# Patient Record
Sex: FEMALE | Race: WHITE | NOT HISPANIC OR LATINO | Employment: PART TIME | URBAN - METROPOLITAN AREA
[De-identification: names, ages, dates, MRNs, and addresses within clinical notes are randomized per-mention and may not be internally consistent; named-entity substitution may affect disease eponyms.]

---

## 2017-01-04 ENCOUNTER — COMMUNICATION - HEALTHEAST (OUTPATIENT)
Dept: FAMILY MEDICINE | Facility: CLINIC | Age: 27
End: 2017-01-04

## 2017-01-04 DIAGNOSIS — G47.00 INSOMNIA: ICD-10-CM

## 2017-01-26 ENCOUNTER — COMMUNICATION - HEALTHEAST (OUTPATIENT)
Dept: TELEHEALTH | Facility: CLINIC | Age: 27
End: 2017-01-26

## 2017-01-30 ENCOUNTER — OFFICE VISIT - HEALTHEAST (OUTPATIENT)
Dept: FAMILY MEDICINE | Facility: CLINIC | Age: 27
End: 2017-01-30

## 2017-01-30 DIAGNOSIS — N20.0 NEPHROLITHIASIS: ICD-10-CM

## 2017-01-30 DIAGNOSIS — Z13.1 DIABETES MELLITUS SCREENING: ICD-10-CM

## 2017-01-30 DIAGNOSIS — G47.00 INSOMNIA, UNSPECIFIED: ICD-10-CM

## 2017-01-30 DIAGNOSIS — Z13.21 ENCOUNTER FOR VITAMIN DEFICIENCY SCREENING: ICD-10-CM

## 2017-01-30 LAB — HBA1C MFR BLD: 4.9 % (ref 3.5–6.1)

## 2017-02-01 ENCOUNTER — COMMUNICATION - HEALTHEAST (OUTPATIENT)
Dept: FAMILY MEDICINE | Facility: CLINIC | Age: 27
End: 2017-02-01

## 2017-02-01 DIAGNOSIS — G47.00 INSOMNIA: ICD-10-CM

## 2017-02-22 ENCOUNTER — RECORDS - HEALTHEAST (OUTPATIENT)
Dept: ADMINISTRATIVE | Facility: OTHER | Age: 27
End: 2017-02-22

## 2017-03-02 ENCOUNTER — COMMUNICATION - HEALTHEAST (OUTPATIENT)
Dept: PHYSICAL MEDICINE AND REHAB | Facility: CLINIC | Age: 27
End: 2017-03-02

## 2017-03-02 DIAGNOSIS — M54.50 LOW BACK PAIN: ICD-10-CM

## 2017-03-02 DIAGNOSIS — M54.16 LUMBAR RADICULITIS: ICD-10-CM

## 2017-03-27 ENCOUNTER — COMMUNICATION - HEALTHEAST (OUTPATIENT)
Dept: PHYSICAL MEDICINE AND REHAB | Facility: CLINIC | Age: 27
End: 2017-03-27

## 2017-03-27 ENCOUNTER — COMMUNICATION - HEALTHEAST (OUTPATIENT)
Dept: FAMILY MEDICINE | Facility: CLINIC | Age: 27
End: 2017-03-27

## 2017-03-27 DIAGNOSIS — M54.16 LUMBAR RADICULITIS: ICD-10-CM

## 2017-03-27 DIAGNOSIS — M54.50 LOW BACK PAIN: ICD-10-CM

## 2017-03-27 DIAGNOSIS — G62.9 NEUROPATHY: ICD-10-CM

## 2017-05-05 ENCOUNTER — COMMUNICATION - HEALTHEAST (OUTPATIENT)
Dept: FAMILY MEDICINE | Facility: CLINIC | Age: 27
End: 2017-05-05

## 2017-05-05 DIAGNOSIS — G47.00 INSOMNIA: ICD-10-CM

## 2017-07-05 ENCOUNTER — COMMUNICATION - HEALTHEAST (OUTPATIENT)
Dept: PHYSICAL MEDICINE AND REHAB | Facility: CLINIC | Age: 27
End: 2017-07-05

## 2017-07-05 DIAGNOSIS — M54.16 LUMBAR RADICULITIS: ICD-10-CM

## 2017-07-05 DIAGNOSIS — M54.50 LOW BACK PAIN: ICD-10-CM

## 2017-09-17 ENCOUNTER — HEALTH MAINTENANCE LETTER (OUTPATIENT)
Age: 27
End: 2017-09-17

## 2017-10-31 ENCOUNTER — COMMUNICATION - HEALTHEAST (OUTPATIENT)
Dept: FAMILY MEDICINE | Facility: CLINIC | Age: 27
End: 2017-10-31

## 2017-10-31 DIAGNOSIS — G47.00 INSOMNIA: ICD-10-CM

## 2017-12-27 ENCOUNTER — AMBULATORY - HEALTHEAST (OUTPATIENT)
Dept: NURSING | Facility: CLINIC | Age: 27
End: 2017-12-27

## 2017-12-27 DIAGNOSIS — Z23 NEED FOR IMMUNIZATION AGAINST INFLUENZA: ICD-10-CM

## 2018-02-06 ENCOUNTER — OFFICE VISIT - HEALTHEAST (OUTPATIENT)
Dept: FAMILY MEDICINE | Facility: CLINIC | Age: 28
End: 2018-02-06

## 2018-02-06 DIAGNOSIS — M53.3 SACROILIAC JOINT DYSFUNCTION OF BOTH SIDES: ICD-10-CM

## 2018-02-06 DIAGNOSIS — R19.8 IRREGULAR BOWEL HABITS: ICD-10-CM

## 2018-03-28 ENCOUNTER — COMMUNICATION - HEALTHEAST (OUTPATIENT)
Dept: FAMILY MEDICINE | Facility: CLINIC | Age: 28
End: 2018-03-28

## 2018-03-28 DIAGNOSIS — G62.9 NEUROPATHY: ICD-10-CM

## 2018-04-13 ENCOUNTER — COMMUNICATION - HEALTHEAST (OUTPATIENT)
Dept: FAMILY MEDICINE | Facility: CLINIC | Age: 28
End: 2018-04-13

## 2018-04-13 DIAGNOSIS — M53.3 SACROILIAC JOINT DYSFUNCTION OF BOTH SIDES: ICD-10-CM

## 2018-04-29 ENCOUNTER — COMMUNICATION - HEALTHEAST (OUTPATIENT)
Dept: FAMILY MEDICINE | Facility: CLINIC | Age: 28
End: 2018-04-29

## 2018-04-29 DIAGNOSIS — G47.00 INSOMNIA: ICD-10-CM

## 2018-05-12 ENCOUNTER — COMMUNICATION - HEALTHEAST (OUTPATIENT)
Dept: FAMILY MEDICINE | Facility: CLINIC | Age: 28
End: 2018-05-12

## 2018-05-12 DIAGNOSIS — M53.3 SACROILIAC JOINT DYSFUNCTION OF BOTH SIDES: ICD-10-CM

## 2018-06-11 ENCOUNTER — COMMUNICATION - HEALTHEAST (OUTPATIENT)
Dept: FAMILY MEDICINE | Facility: CLINIC | Age: 28
End: 2018-06-11

## 2018-06-11 DIAGNOSIS — M53.3 SACROILIAC JOINT DYSFUNCTION OF BOTH SIDES: ICD-10-CM

## 2018-07-08 ENCOUNTER — COMMUNICATION - HEALTHEAST (OUTPATIENT)
Dept: FAMILY MEDICINE | Facility: CLINIC | Age: 28
End: 2018-07-08

## 2018-07-08 DIAGNOSIS — M53.3 SACROILIAC JOINT DYSFUNCTION OF BOTH SIDES: ICD-10-CM

## 2018-08-08 ENCOUNTER — COMMUNICATION - HEALTHEAST (OUTPATIENT)
Dept: FAMILY MEDICINE | Facility: CLINIC | Age: 28
End: 2018-08-08

## 2018-08-08 DIAGNOSIS — M53.3 SACROILIAC JOINT DYSFUNCTION OF BOTH SIDES: ICD-10-CM

## 2018-08-28 ENCOUNTER — COMMUNICATION - HEALTHEAST (OUTPATIENT)
Dept: FAMILY MEDICINE | Facility: CLINIC | Age: 28
End: 2018-08-28

## 2018-08-28 DIAGNOSIS — G62.9 NEUROPATHY: ICD-10-CM

## 2018-09-04 ENCOUNTER — COMMUNICATION - HEALTHEAST (OUTPATIENT)
Dept: FAMILY MEDICINE | Facility: CLINIC | Age: 28
End: 2018-09-04

## 2018-09-04 DIAGNOSIS — M53.3 SACROILIAC JOINT DYSFUNCTION OF BOTH SIDES: ICD-10-CM

## 2018-10-05 ENCOUNTER — COMMUNICATION - HEALTHEAST (OUTPATIENT)
Dept: FAMILY MEDICINE | Facility: CLINIC | Age: 28
End: 2018-10-05

## 2018-10-05 DIAGNOSIS — M53.3 SACROILIAC JOINT DYSFUNCTION OF BOTH SIDES: ICD-10-CM

## 2018-11-05 ENCOUNTER — COMMUNICATION - HEALTHEAST (OUTPATIENT)
Dept: FAMILY MEDICINE | Facility: CLINIC | Age: 28
End: 2018-11-05

## 2018-11-05 DIAGNOSIS — M53.3 SACROILIAC JOINT DYSFUNCTION OF BOTH SIDES: ICD-10-CM

## 2018-11-30 ENCOUNTER — COMMUNICATION - HEALTHEAST (OUTPATIENT)
Dept: FAMILY MEDICINE | Facility: CLINIC | Age: 28
End: 2018-11-30

## 2018-11-30 DIAGNOSIS — M53.3 SACROILIAC JOINT DYSFUNCTION OF BOTH SIDES: ICD-10-CM

## 2018-12-01 ENCOUNTER — COMMUNICATION - HEALTHEAST (OUTPATIENT)
Dept: FAMILY MEDICINE | Facility: CLINIC | Age: 28
End: 2018-12-01

## 2018-12-01 DIAGNOSIS — M53.3 SACROILIAC JOINT DYSFUNCTION OF BOTH SIDES: ICD-10-CM

## 2018-12-11 ENCOUNTER — OFFICE VISIT - HEALTHEAST (OUTPATIENT)
Dept: FAMILY MEDICINE | Facility: CLINIC | Age: 28
End: 2018-12-11

## 2018-12-11 DIAGNOSIS — E55.9 VITAMIN D DEFICIENCY: ICD-10-CM

## 2018-12-11 DIAGNOSIS — Z00.00 ROUTINE GENERAL MEDICAL EXAMINATION AT A HEALTH CARE FACILITY: ICD-10-CM

## 2018-12-11 DIAGNOSIS — G47.00 INSOMNIA: ICD-10-CM

## 2018-12-11 DIAGNOSIS — M51.26 DISPLACEMENT OF LUMBAR INTERVERTEBRAL DISC WITHOUT MYELOPATHY: ICD-10-CM

## 2018-12-11 DIAGNOSIS — F51.01 PRIMARY INSOMNIA: ICD-10-CM

## 2018-12-11 ASSESSMENT — MIFFLIN-ST. JEOR: SCORE: 1346.56

## 2018-12-17 LAB
BKR LAB AP ABNORMAL BLEEDING: NO
BKR LAB AP BIRTH CONTROL/HORMONES: NORMAL
BKR LAB AP CERVICAL APPEARANCE: NORMAL
BKR LAB AP GYN ADEQUACY: NORMAL
BKR LAB AP GYN INTERPRETATION: NORMAL
BKR LAB AP HPV REFLEX: NORMAL
BKR LAB AP LMP: NORMAL
BKR LAB AP PATIENT STATUS: NORMAL
BKR LAB AP PREVIOUS ABNORMAL: NORMAL
BKR LAB AP PREVIOUS NORMAL: 2015
HIGH RISK?: NO
PATH REPORT.COMMENTS IMP SPEC: NORMAL
RESULT FLAG (HE HISTORICAL CONVERSION): NORMAL

## 2018-12-26 ENCOUNTER — COMMUNICATION - HEALTHEAST (OUTPATIENT)
Dept: FAMILY MEDICINE | Facility: CLINIC | Age: 28
End: 2018-12-26

## 2018-12-26 DIAGNOSIS — M53.3 SACROILIAC JOINT DYSFUNCTION OF BOTH SIDES: ICD-10-CM

## 2019-01-24 ENCOUNTER — COMMUNICATION - HEALTHEAST (OUTPATIENT)
Dept: FAMILY MEDICINE | Facility: CLINIC | Age: 29
End: 2019-01-24

## 2019-01-24 DIAGNOSIS — M53.3 SACROILIAC JOINT DYSFUNCTION OF BOTH SIDES: ICD-10-CM

## 2019-03-19 ENCOUNTER — OFFICE VISIT - HEALTHEAST (OUTPATIENT)
Dept: FAMILY MEDICINE | Facility: CLINIC | Age: 29
End: 2019-03-19

## 2019-03-19 DIAGNOSIS — T17.998A ASPIRATION OF LIQUID, INITIAL ENCOUNTER: ICD-10-CM

## 2019-03-19 DIAGNOSIS — R05.9 COUGH: ICD-10-CM

## 2019-05-14 ENCOUNTER — COMMUNICATION - HEALTHEAST (OUTPATIENT)
Dept: FAMILY MEDICINE | Facility: CLINIC | Age: 29
End: 2019-05-14

## 2019-05-14 DIAGNOSIS — M53.3 SACROILIAC JOINT DYSFUNCTION OF BOTH SIDES: ICD-10-CM

## 2019-05-17 ENCOUNTER — COMMUNICATION - HEALTHEAST (OUTPATIENT)
Dept: FAMILY MEDICINE | Facility: CLINIC | Age: 29
End: 2019-05-17

## 2019-05-17 DIAGNOSIS — G62.9 NEUROPATHY: ICD-10-CM

## 2019-08-24 ENCOUNTER — COMMUNICATION - HEALTHEAST (OUTPATIENT)
Dept: FAMILY MEDICINE | Facility: CLINIC | Age: 29
End: 2019-08-24

## 2019-08-24 DIAGNOSIS — M53.3 SACROILIAC JOINT DYSFUNCTION OF BOTH SIDES: ICD-10-CM

## 2019-09-17 ENCOUNTER — OFFICE VISIT - HEALTHEAST (OUTPATIENT)
Dept: FAMILY MEDICINE | Facility: CLINIC | Age: 29
End: 2019-09-17

## 2019-09-17 DIAGNOSIS — G62.9 NEUROPATHY: ICD-10-CM

## 2019-09-17 DIAGNOSIS — F51.01 PRIMARY INSOMNIA: ICD-10-CM

## 2019-09-17 DIAGNOSIS — M51.26 DISPLACEMENT OF LUMBAR INTERVERTEBRAL DISC WITHOUT MYELOPATHY: ICD-10-CM

## 2019-09-17 DIAGNOSIS — Z13.1 SCREENING FOR DIABETES MELLITUS: ICD-10-CM

## 2019-09-17 DIAGNOSIS — M53.3 SACROILIAC JOINT DYSFUNCTION OF BOTH SIDES: ICD-10-CM

## 2019-09-17 DIAGNOSIS — R03.0 ELEVATED BLOOD PRESSURE READING WITHOUT DIAGNOSIS OF HYPERTENSION: ICD-10-CM

## 2019-09-17 LAB — HBA1C MFR BLD: 5.1 % (ref 3.5–6)

## 2019-09-18 ENCOUNTER — OFFICE VISIT - HEALTHEAST (OUTPATIENT)
Dept: FAMILY MEDICINE | Facility: CLINIC | Age: 29
End: 2019-09-18

## 2019-09-18 DIAGNOSIS — G44.209 TENSION HEADACHE: ICD-10-CM

## 2019-09-18 DIAGNOSIS — I10 ESSENTIAL HYPERTENSION: ICD-10-CM

## 2019-09-18 LAB
ALBUMIN SERPL-MCNC: 4 G/DL (ref 3.5–5)
ALP SERPL-CCNC: 85 U/L (ref 45–120)
ALT SERPL W P-5'-P-CCNC: <9 U/L (ref 0–45)
ANION GAP SERPL CALCULATED.3IONS-SCNC: 9 MMOL/L (ref 5–18)
AST SERPL W P-5'-P-CCNC: 19 U/L (ref 0–40)
BILIRUB SERPL-MCNC: 0.3 MG/DL (ref 0–1)
BUN SERPL-MCNC: 7 MG/DL (ref 8–22)
CALCIUM SERPL-MCNC: 9.7 MG/DL (ref 8.5–10.5)
CHLORIDE BLD-SCNC: 101 MMOL/L (ref 98–107)
CO2 SERPL-SCNC: 29 MMOL/L (ref 22–31)
CREAT SERPL-MCNC: 0.78 MG/DL (ref 0.6–1.1)
ERYTHROCYTE [DISTWIDTH] IN BLOOD BY AUTOMATED COUNT: 12.1 % (ref 11–14.5)
GFR SERPL CREATININE-BSD FRML MDRD: >60 ML/MIN/1.73M2
GLUCOSE BLD-MCNC: 89 MG/DL (ref 70–125)
HCT VFR BLD AUTO: 41.7 % (ref 35–47)
HGB BLD-MCNC: 13.7 G/DL (ref 12–16)
MCH RBC QN AUTO: 29 PG (ref 27–34)
MCHC RBC AUTO-ENTMCNC: 32.9 G/DL (ref 32–36)
MCV RBC AUTO: 88 FL (ref 80–100)
PLATELET # BLD AUTO: 309 THOU/UL (ref 140–440)
PMV BLD AUTO: 7.2 FL (ref 7–10)
POTASSIUM BLD-SCNC: 5.1 MMOL/L (ref 3.5–5)
PROT SERPL-MCNC: 7.1 G/DL (ref 6–8)
RBC # BLD AUTO: 4.73 MILL/UL (ref 3.8–5.4)
SODIUM SERPL-SCNC: 139 MMOL/L (ref 136–145)
TSH SERPL DL<=0.005 MIU/L-ACNC: 0.81 UIU/ML (ref 0.3–5)
WBC: 7.2 THOU/UL (ref 4–11)

## 2019-09-18 ASSESSMENT — MIFFLIN-ST. JEOR: SCORE: 1326.43

## 2019-09-25 ENCOUNTER — OFFICE VISIT - HEALTHEAST (OUTPATIENT)
Dept: FAMILY MEDICINE | Facility: CLINIC | Age: 29
End: 2019-09-25

## 2019-09-25 DIAGNOSIS — I10 ESSENTIAL HYPERTENSION: ICD-10-CM

## 2019-09-25 DIAGNOSIS — G62.9 NEUROPATHY: ICD-10-CM

## 2019-09-25 DIAGNOSIS — M51.26 DISPLACEMENT OF LUMBAR INTERVERTEBRAL DISC WITHOUT MYELOPATHY: ICD-10-CM

## 2019-09-25 DIAGNOSIS — G89.29 CHRONIC NONINTRACTABLE HEADACHE, UNSPECIFIED HEADACHE TYPE: ICD-10-CM

## 2019-09-25 DIAGNOSIS — M53.3 SACROILIAC JOINT DYSFUNCTION OF BOTH SIDES: ICD-10-CM

## 2019-09-25 DIAGNOSIS — R51.9 CHRONIC NONINTRACTABLE HEADACHE, UNSPECIFIED HEADACHE TYPE: ICD-10-CM

## 2019-09-25 ASSESSMENT — MIFFLIN-ST. JEOR: SCORE: 1335.79

## 2019-10-01 ENCOUNTER — OFFICE VISIT - HEALTHEAST (OUTPATIENT)
Dept: FAMILY MEDICINE | Facility: CLINIC | Age: 29
End: 2019-10-01

## 2019-10-01 DIAGNOSIS — I10 ESSENTIAL HYPERTENSION: ICD-10-CM

## 2019-10-01 LAB
ANION GAP SERPL CALCULATED.3IONS-SCNC: 12 MMOL/L (ref 5–18)
BUN SERPL-MCNC: 8 MG/DL (ref 8–22)
CALCIUM SERPL-MCNC: 9.9 MG/DL (ref 8.5–10.5)
CHLORIDE BLD-SCNC: 97 MMOL/L (ref 98–107)
CO2 SERPL-SCNC: 28 MMOL/L (ref 22–31)
CREAT SERPL-MCNC: 0.84 MG/DL (ref 0.6–1.1)
GFR SERPL CREATININE-BSD FRML MDRD: >60 ML/MIN/1.73M2
GLUCOSE BLD-MCNC: 107 MG/DL (ref 70–125)
POTASSIUM BLD-SCNC: 4.4 MMOL/L (ref 3.5–5)
SODIUM SERPL-SCNC: 137 MMOL/L (ref 136–145)

## 2019-10-01 ASSESSMENT — MIFFLIN-ST. JEOR: SCORE: 1326.43

## 2019-10-13 ENCOUNTER — COMMUNICATION - HEALTHEAST (OUTPATIENT)
Dept: FAMILY MEDICINE | Facility: CLINIC | Age: 29
End: 2019-10-13

## 2019-10-13 DIAGNOSIS — R51.9 CHRONIC NONINTRACTABLE HEADACHE, UNSPECIFIED HEADACHE TYPE: ICD-10-CM

## 2019-10-13 DIAGNOSIS — G89.29 CHRONIC NONINTRACTABLE HEADACHE, UNSPECIFIED HEADACHE TYPE: ICD-10-CM

## 2019-10-13 DIAGNOSIS — G62.9 NEUROPATHY: ICD-10-CM

## 2019-10-13 DIAGNOSIS — I10 ESSENTIAL HYPERTENSION: ICD-10-CM

## 2019-10-13 DIAGNOSIS — M53.3 SACROILIAC JOINT DYSFUNCTION OF BOTH SIDES: ICD-10-CM

## 2019-10-13 DIAGNOSIS — M51.26 DISPLACEMENT OF LUMBAR INTERVERTEBRAL DISC WITHOUT MYELOPATHY: ICD-10-CM

## 2019-10-31 ENCOUNTER — COMMUNICATION - HEALTHEAST (OUTPATIENT)
Dept: ADMINISTRATIVE | Facility: CLINIC | Age: 29
End: 2019-10-31

## 2019-11-04 ENCOUNTER — COMMUNICATION - HEALTHEAST (OUTPATIENT)
Dept: FAMILY MEDICINE | Facility: CLINIC | Age: 29
End: 2019-11-04

## 2019-11-04 DIAGNOSIS — I10 ESSENTIAL HYPERTENSION: ICD-10-CM

## 2019-12-02 ENCOUNTER — OFFICE VISIT - HEALTHEAST (OUTPATIENT)
Dept: FAMILY MEDICINE | Facility: CLINIC | Age: 29
End: 2019-12-02

## 2019-12-02 DIAGNOSIS — I10 ESSENTIAL HYPERTENSION: ICD-10-CM

## 2019-12-02 ASSESSMENT — MIFFLIN-ST. JEOR: SCORE: 1332.1

## 2020-01-04 ENCOUNTER — COMMUNICATION - HEALTHEAST (OUTPATIENT)
Dept: FAMILY MEDICINE | Facility: CLINIC | Age: 30
End: 2020-01-04

## 2020-01-04 DIAGNOSIS — M53.3 SACROILIAC JOINT DYSFUNCTION OF BOTH SIDES: ICD-10-CM

## 2020-01-04 DIAGNOSIS — G89.29 CHRONIC NONINTRACTABLE HEADACHE, UNSPECIFIED HEADACHE TYPE: ICD-10-CM

## 2020-01-04 DIAGNOSIS — M51.26 DISPLACEMENT OF LUMBAR INTERVERTEBRAL DISC WITHOUT MYELOPATHY: ICD-10-CM

## 2020-01-04 DIAGNOSIS — R51.9 CHRONIC NONINTRACTABLE HEADACHE, UNSPECIFIED HEADACHE TYPE: ICD-10-CM

## 2020-01-04 DIAGNOSIS — G62.9 NEUROPATHY: ICD-10-CM

## 2020-01-06 ENCOUNTER — COMMUNICATION - HEALTHEAST (OUTPATIENT)
Dept: FAMILY MEDICINE | Facility: CLINIC | Age: 30
End: 2020-01-06

## 2020-01-06 DIAGNOSIS — E55.9 VITAMIN D DEFICIENCY: ICD-10-CM

## 2020-02-10 ENCOUNTER — COMMUNICATION - HEALTHEAST (OUTPATIENT)
Dept: FAMILY MEDICINE | Facility: CLINIC | Age: 30
End: 2020-02-10

## 2020-02-10 DIAGNOSIS — I10 ESSENTIAL HYPERTENSION: ICD-10-CM

## 2020-02-14 ENCOUNTER — COMMUNICATION - HEALTHEAST (OUTPATIENT)
Dept: FAMILY MEDICINE | Facility: CLINIC | Age: 30
End: 2020-02-14

## 2020-02-14 DIAGNOSIS — M53.3 SACROILIAC JOINT DYSFUNCTION OF BOTH SIDES: ICD-10-CM

## 2020-02-14 DIAGNOSIS — G62.9 NEUROPATHY: ICD-10-CM

## 2020-02-14 DIAGNOSIS — M51.26 DISPLACEMENT OF LUMBAR INTERVERTEBRAL DISC WITHOUT MYELOPATHY: ICD-10-CM

## 2020-02-17 ENCOUNTER — COMMUNICATION - HEALTHEAST (OUTPATIENT)
Dept: FAMILY MEDICINE | Facility: CLINIC | Age: 30
End: 2020-02-17

## 2020-02-17 DIAGNOSIS — G62.9 NEUROPATHY: ICD-10-CM

## 2020-02-17 DIAGNOSIS — M53.3 SACROILIAC JOINT DYSFUNCTION OF BOTH SIDES: ICD-10-CM

## 2020-02-17 DIAGNOSIS — M51.26 DISPLACEMENT OF LUMBAR INTERVERTEBRAL DISC WITHOUT MYELOPATHY: ICD-10-CM

## 2020-03-05 ENCOUNTER — COMMUNICATION - HEALTHEAST (OUTPATIENT)
Dept: FAMILY MEDICINE | Facility: CLINIC | Age: 30
End: 2020-03-05

## 2020-03-05 DIAGNOSIS — M51.26 DISPLACEMENT OF LUMBAR INTERVERTEBRAL DISC WITHOUT MYELOPATHY: ICD-10-CM

## 2020-03-05 DIAGNOSIS — G62.9 NEUROPATHY: ICD-10-CM

## 2020-03-05 DIAGNOSIS — M53.3 SACROILIAC JOINT DYSFUNCTION OF BOTH SIDES: ICD-10-CM

## 2020-03-05 DIAGNOSIS — R51.9 CHRONIC NONINTRACTABLE HEADACHE, UNSPECIFIED HEADACHE TYPE: ICD-10-CM

## 2020-03-05 DIAGNOSIS — G89.29 CHRONIC NONINTRACTABLE HEADACHE, UNSPECIFIED HEADACHE TYPE: ICD-10-CM

## 2020-04-06 ENCOUNTER — COMMUNICATION - HEALTHEAST (OUTPATIENT)
Dept: FAMILY MEDICINE | Facility: CLINIC | Age: 30
End: 2020-04-06

## 2020-04-06 DIAGNOSIS — M53.3 SACROILIAC JOINT DYSFUNCTION OF BOTH SIDES: ICD-10-CM

## 2020-04-06 DIAGNOSIS — G89.29 CHRONIC NONINTRACTABLE HEADACHE, UNSPECIFIED HEADACHE TYPE: ICD-10-CM

## 2020-04-06 DIAGNOSIS — G62.9 NEUROPATHY: ICD-10-CM

## 2020-04-06 DIAGNOSIS — R51.9 CHRONIC NONINTRACTABLE HEADACHE, UNSPECIFIED HEADACHE TYPE: ICD-10-CM

## 2020-04-06 DIAGNOSIS — M51.26 DISPLACEMENT OF LUMBAR INTERVERTEBRAL DISC WITHOUT MYELOPATHY: ICD-10-CM

## 2020-05-07 ENCOUNTER — COMMUNICATION - HEALTHEAST (OUTPATIENT)
Dept: FAMILY MEDICINE | Facility: CLINIC | Age: 30
End: 2020-05-07

## 2020-05-12 ENCOUNTER — OFFICE VISIT - HEALTHEAST (OUTPATIENT)
Dept: FAMILY MEDICINE | Facility: CLINIC | Age: 30
End: 2020-05-12

## 2020-05-12 DIAGNOSIS — M51.26 DISPLACEMENT OF LUMBAR INTERVERTEBRAL DISC WITHOUT MYELOPATHY: ICD-10-CM

## 2020-05-12 DIAGNOSIS — M53.3 SACROILIAC JOINT DYSFUNCTION OF BOTH SIDES: ICD-10-CM

## 2020-05-12 DIAGNOSIS — I10 ESSENTIAL HYPERTENSION: ICD-10-CM

## 2020-05-12 DIAGNOSIS — G89.29 CHRONIC NONINTRACTABLE HEADACHE, UNSPECIFIED HEADACHE TYPE: ICD-10-CM

## 2020-05-12 DIAGNOSIS — G62.9 NEUROPATHY: ICD-10-CM

## 2020-05-12 DIAGNOSIS — R51.9 CHRONIC NONINTRACTABLE HEADACHE, UNSPECIFIED HEADACHE TYPE: ICD-10-CM

## 2020-05-25 ENCOUNTER — COMMUNICATION - HEALTHEAST (OUTPATIENT)
Dept: FAMILY MEDICINE | Facility: CLINIC | Age: 30
End: 2020-05-25

## 2020-05-26 ENCOUNTER — COMMUNICATION - HEALTHEAST (OUTPATIENT)
Dept: SCHEDULING | Facility: CLINIC | Age: 30
End: 2020-05-26

## 2020-05-26 ENCOUNTER — OFFICE VISIT - HEALTHEAST (OUTPATIENT)
Dept: FAMILY MEDICINE | Facility: CLINIC | Age: 30
End: 2020-05-26

## 2020-05-26 DIAGNOSIS — M54.42 ACUTE BILATERAL LOW BACK PAIN WITH BILATERAL SCIATICA: ICD-10-CM

## 2020-05-26 DIAGNOSIS — M54.41 ACUTE BILATERAL LOW BACK PAIN WITH BILATERAL SCIATICA: ICD-10-CM

## 2020-06-18 ENCOUNTER — COMMUNICATION - HEALTHEAST (OUTPATIENT)
Dept: FAMILY MEDICINE | Facility: CLINIC | Age: 30
End: 2020-06-18

## 2020-06-19 ENCOUNTER — OFFICE VISIT - HEALTHEAST (OUTPATIENT)
Dept: FAMILY MEDICINE | Facility: CLINIC | Age: 30
End: 2020-06-19

## 2020-06-19 DIAGNOSIS — L55.9 SUNBURN: ICD-10-CM

## 2020-08-01 ENCOUNTER — COMMUNICATION - HEALTHEAST (OUTPATIENT)
Dept: FAMILY MEDICINE | Facility: CLINIC | Age: 30
End: 2020-08-01

## 2020-08-04 ENCOUNTER — OFFICE VISIT - HEALTHEAST (OUTPATIENT)
Dept: FAMILY MEDICINE | Facility: CLINIC | Age: 30
End: 2020-08-04

## 2020-08-04 DIAGNOSIS — M51.26 DISPLACEMENT OF LUMBAR INTERVERTEBRAL DISC WITHOUT MYELOPATHY: ICD-10-CM

## 2020-08-04 DIAGNOSIS — G62.9 NEUROPATHY: ICD-10-CM

## 2020-08-04 DIAGNOSIS — M54.16 LUMBAR RADICULOPATHY: ICD-10-CM

## 2020-08-04 DIAGNOSIS — M53.3 SACROILIAC JOINT DYSFUNCTION OF BOTH SIDES: ICD-10-CM

## 2020-08-04 DIAGNOSIS — R51.9 CHRONIC NONINTRACTABLE HEADACHE, UNSPECIFIED HEADACHE TYPE: ICD-10-CM

## 2020-08-04 DIAGNOSIS — G89.29 CHRONIC NONINTRACTABLE HEADACHE, UNSPECIFIED HEADACHE TYPE: ICD-10-CM

## 2020-08-17 ENCOUNTER — HOSPITAL ENCOUNTER (OUTPATIENT)
Dept: PHYSICAL MEDICINE AND REHAB | Facility: CLINIC | Age: 30
Discharge: HOME | End: 2020-08-17
Attending: FAMILY MEDICINE

## 2020-08-17 DIAGNOSIS — M51.26 DISPLACEMENT OF LUMBAR INTERVERTEBRAL DISC WITHOUT MYELOPATHY: ICD-10-CM

## 2020-08-17 DIAGNOSIS — M53.3 SACROILIAC JOINT DYSFUNCTION OF BOTH SIDES: ICD-10-CM

## 2020-08-17 ASSESSMENT — MIFFLIN-ST. JEOR: SCORE: 1330.97

## 2020-08-23 ENCOUNTER — COMMUNICATION - HEALTHEAST (OUTPATIENT)
Dept: PHYSICAL MEDICINE AND REHAB | Facility: CLINIC | Age: 30
End: 2020-08-23

## 2020-08-23 DIAGNOSIS — M51.26 DISPLACEMENT OF LUMBAR INTERVERTEBRAL DISC WITHOUT MYELOPATHY: ICD-10-CM

## 2020-08-23 DIAGNOSIS — M53.3 SACROILIAC JOINT DYSFUNCTION OF BOTH SIDES: ICD-10-CM

## 2020-08-23 DIAGNOSIS — R51.9 CHRONIC NONINTRACTABLE HEADACHE, UNSPECIFIED HEADACHE TYPE: ICD-10-CM

## 2020-08-23 DIAGNOSIS — G62.9 NEUROPATHY: ICD-10-CM

## 2020-08-23 DIAGNOSIS — G89.29 CHRONIC NONINTRACTABLE HEADACHE, UNSPECIFIED HEADACHE TYPE: ICD-10-CM

## 2020-09-01 ENCOUNTER — OFFICE VISIT - HEALTHEAST (OUTPATIENT)
Dept: PHYSICAL THERAPY | Facility: REHABILITATION | Age: 30
End: 2020-09-01

## 2020-09-01 DIAGNOSIS — M53.3 SACROILIAC JOINT DYSFUNCTION: ICD-10-CM

## 2021-01-04 ENCOUNTER — COMMUNICATION - HEALTHEAST (OUTPATIENT)
Dept: FAMILY MEDICINE | Facility: CLINIC | Age: 31
End: 2021-01-04

## 2021-01-04 DIAGNOSIS — E55.9 VITAMIN D DEFICIENCY: ICD-10-CM

## 2021-01-05 ENCOUNTER — COMMUNICATION - HEALTHEAST (OUTPATIENT)
Dept: FAMILY MEDICINE | Facility: CLINIC | Age: 31
End: 2021-01-05

## 2021-01-05 ENCOUNTER — COMMUNICATION - HEALTHEAST (OUTPATIENT)
Dept: PEDIATRICS | Facility: CLINIC | Age: 31
End: 2021-01-05

## 2021-01-05 DIAGNOSIS — E55.9 VITAMIN D DEFICIENCY: ICD-10-CM

## 2021-01-07 ENCOUNTER — COMMUNICATION - HEALTHEAST (OUTPATIENT)
Dept: FAMILY MEDICINE | Facility: CLINIC | Age: 31
End: 2021-01-07

## 2021-01-07 DIAGNOSIS — M51.26 DISPLACEMENT OF LUMBAR INTERVERTEBRAL DISC WITHOUT MYELOPATHY: ICD-10-CM

## 2021-01-07 DIAGNOSIS — G62.9 NEUROPATHY: ICD-10-CM

## 2021-01-07 DIAGNOSIS — M53.3 SACROILIAC JOINT DYSFUNCTION OF BOTH SIDES: ICD-10-CM

## 2021-02-21 ENCOUNTER — COMMUNICATION - HEALTHEAST (OUTPATIENT)
Dept: FAMILY MEDICINE | Facility: CLINIC | Age: 31
End: 2021-02-21

## 2021-03-24 ENCOUNTER — OFFICE VISIT - HEALTHEAST (OUTPATIENT)
Dept: FAMILY MEDICINE | Facility: CLINIC | Age: 31
End: 2021-03-24

## 2021-03-24 ENCOUNTER — COMMUNICATION - HEALTHEAST (OUTPATIENT)
Dept: FAMILY MEDICINE | Facility: CLINIC | Age: 31
End: 2021-03-24

## 2021-03-24 DIAGNOSIS — K08.89 PAIN, DENTAL: ICD-10-CM

## 2021-03-30 ENCOUNTER — AMBULATORY - HEALTHEAST (OUTPATIENT)
Dept: NURSING | Facility: CLINIC | Age: 31
End: 2021-03-30

## 2021-04-08 ENCOUNTER — COMMUNICATION - HEALTHEAST (OUTPATIENT)
Dept: FAMILY MEDICINE | Facility: CLINIC | Age: 31
End: 2021-04-08

## 2021-04-08 DIAGNOSIS — E55.9 VITAMIN D DEFICIENCY: ICD-10-CM

## 2021-04-20 ENCOUNTER — AMBULATORY - HEALTHEAST (OUTPATIENT)
Dept: NURSING | Facility: CLINIC | Age: 31
End: 2021-04-20

## 2021-05-24 ENCOUNTER — RECORDS - HEALTHEAST (OUTPATIENT)
Dept: FAMILY MEDICINE | Facility: CLINIC | Age: 31
End: 2021-05-24

## 2021-05-25 ENCOUNTER — OFFICE VISIT - HEALTHEAST (OUTPATIENT)
Dept: FAMILY MEDICINE | Facility: CLINIC | Age: 31
End: 2021-05-25

## 2021-05-25 DIAGNOSIS — I10 ESSENTIAL HYPERTENSION: ICD-10-CM

## 2021-05-25 DIAGNOSIS — Z13.1 SCREENING FOR DIABETES MELLITUS: ICD-10-CM

## 2021-05-25 DIAGNOSIS — F39 MOOD DISORDER (H): ICD-10-CM

## 2021-05-25 DIAGNOSIS — G89.4 CHRONIC PAIN SYNDROME: ICD-10-CM

## 2021-05-25 LAB
ALBUMIN SERPL-MCNC: 3.7 G/DL (ref 3.5–5)
ALP SERPL-CCNC: 89 U/L (ref 45–120)
ALT SERPL W P-5'-P-CCNC: <9 U/L (ref 0–45)
ANION GAP SERPL CALCULATED.3IONS-SCNC: 10 MMOL/L (ref 5–18)
AST SERPL W P-5'-P-CCNC: 12 U/L (ref 0–40)
BILIRUB SERPL-MCNC: 0.3 MG/DL (ref 0–1)
BUN SERPL-MCNC: 7 MG/DL (ref 8–22)
CALCIUM SERPL-MCNC: 9.2 MG/DL (ref 8.5–10.5)
CHLORIDE BLD-SCNC: 102 MMOL/L (ref 98–107)
CO2 SERPL-SCNC: 28 MMOL/L (ref 22–31)
CREAT SERPL-MCNC: 0.72 MG/DL (ref 0.6–1.1)
GFR SERPL CREATININE-BSD FRML MDRD: >60 ML/MIN/1.73M2
GLUCOSE BLD-MCNC: 92 MG/DL (ref 70–125)
HBA1C MFR BLD: 5 %
POTASSIUM BLD-SCNC: 4.5 MMOL/L (ref 3.5–5)
PROT SERPL-MCNC: 6.9 G/DL (ref 6–8)
SODIUM SERPL-SCNC: 140 MMOL/L (ref 136–145)
TSH SERPL DL<=0.005 MIU/L-ACNC: 0.82 UIU/ML (ref 0.3–5)

## 2021-05-25 ASSESSMENT — ANXIETY QUESTIONNAIRES
5. BEING SO RESTLESS THAT IT IS HARD TO SIT STILL: NOT AT ALL
1. FEELING NERVOUS, ANXIOUS, OR ON EDGE: NOT AT ALL
2. NOT BEING ABLE TO STOP OR CONTROL WORRYING: NOT AT ALL
4. TROUBLE RELAXING: SEVERAL DAYS
3. WORRYING TOO MUCH ABOUT DIFFERENT THINGS: NOT AT ALL
6. BECOMING EASILY ANNOYED OR IRRITABLE: SEVERAL DAYS
GAD7 TOTAL SCORE: 2
7. FEELING AFRAID AS IF SOMETHING AWFUL MIGHT HAPPEN: NOT AT ALL
IF YOU CHECKED OFF ANY PROBLEMS ON THIS QUESTIONNAIRE, HOW DIFFICULT HAVE THESE PROBLEMS MADE IT FOR YOU TO DO YOUR WORK, TAKE CARE OF THINGS AT HOME, OR GET ALONG WITH OTHER PEOPLE: NOT DIFFICULT AT ALL

## 2021-05-25 ASSESSMENT — PATIENT HEALTH QUESTIONNAIRE - PHQ9: SUM OF ALL RESPONSES TO PHQ QUESTIONS 1-9: 7

## 2021-05-30 VITALS — BODY MASS INDEX: 23.24 KG/M2 | WEIGHT: 127.06 LBS

## 2021-05-31 VITALS — WEIGHT: 146 LBS | BODY MASS INDEX: 26.7 KG/M2

## 2021-06-01 NOTE — PROGRESS NOTES
ASSESSMENT/PLAN:    Essential hypertension  29-year-old female new diagnosis of hypertension.  I will start her on lisinopril.  Check lab CMP and thyroid.  Check blood pressure daily.  Follow-up in 1 week  -     lisinopril (PRINIVIL,ZESTRIL) 10 MG tablet; Take 1 tablet (10 mg total) by mouth daily.  -     Comprehensive Metabolic Panel  -     Thyroid [place]    Tension headache  -     ketorolac injection 30 mg (TORADOL)  -     HM2(CBC w/o Differential)  -     Comprehensive Metabolic Panel    Orders Placed This Encounter   Procedures     HM2(CBC w/o Differential)     Comprehensive Metabolic Panel     Thyroid [place]     Administrations This Visit     ketorolac injection 30 mg (TORADOL)     Admin Date  09/18/2019 Action  Given Dose  30 mg Route  Intravenous Administered By  Jimmy Jerez CMA                  CHIEF COMPLAINT:  Chief Complaint   Patient presents with     BP issue       HISTORY OF PRESENT ILLNESS:  Jeannine is a 29 y.o. female presenting to the clinic today with high blood pressures. She is accompanied by her father and mother.     Her blood pressures have been high all day. Her systolics have been 130-140s. Her diastyolics have been above 100 all day. She has been checking it every 30 minutes all day using a wrist blood pressure reader. It has only been occurring today. She got a migraine at 3am today and it has not gone away. She has tried to take tylenol to relieve the pain but it has not been successful. Her blood sugars have been good. She denies any chest pain.      Family history of diabetes and hypertension    REVIEW OF SYSTEMS:   Constitutional: Negative.   HENT: Negative.   Eyes: Negative.   Respiratory: Negative.   Cardiovascular: Negative.   Gastrointestinal: Negative.   Endocrine: Negative.   Genitourinary: Negative.   Musculoskeletal: Negative.   Skin: Negative.   Allergic/Immunologic: Negative.   Neurological: Negative.   Hematological: Negative.   Psychiatric/Behavioral: Negative.   All  "other systems are negative.    TOBACCO USE:  Social History     Tobacco Use   Smoking Status Passive Smoke Exposure - Never Smoker   Smokeless Tobacco Never Used       VITALS:  Vitals:    09/18/19 1509 09/18/19 1514   BP: (!) 146/120 (!) 134/120   Patient Site: Left Arm Left Arm   Patient Position: Sitting Sitting   Cuff Size: Adult Regular Adult Regular   Pulse: 88    Weight: 144 lb (65.3 kg)    Height: 5' 2\" (1.575 m)      Wt Readings from Last 3 Encounters:   09/18/19 144 lb (65.3 kg)   09/17/19 144 lb 6 oz (65.5 kg)   03/19/19 149 lb 8 oz (67.8 kg)       PHYSICAL EXAM:  Constitutional: Patient is oriented to person, place, and time. Patient appears well-developed and well-nourished. No distress.   Head: Normocephalic and atraumatic.   Right Ear: External ear normal.   Left Ear: External ear normal.   Nose: Nose normal.   Cardiovascular: Normal rate, regular rhythm, normal heart sounds and intact distal pulses. No murmur heard.   Pulmonary/Chest: Effort normal and breath sounds normal. No stridor. No respiratory distress. Patient has no wheezes, no rales, exhibits no tenderness.   Neurological: Patient is alert and oriented to person, place, and time. Patient has normal reflexes. No cranial nerve deficit. Coordination normal.   Skin: Skin is warm and dry. No rash noted. Patient is not diaphoretic. No erythema. No pallor.    ADDITIONAL HISTORY SUMMARIZED (2): None.  DECISION TO OBTAIN EXTRA INFORMATION (1): None.   RADIOLOGY TESTS (1): None.  LABS (1): Ordered labs today.   MEDICINE TESTS (1): None.  INDEPENDENT REVIEW (2 each): None.     IJania,  am scribing for and in the presence of, Dr. Heller.    I, Dr. Heller, personally performed the services described in this documentation, as scribed by Jania Morales in my presence, and it is both accurate and complete.    MEDICATIONS:  Current Outpatient Medications   Medication Sig Dispense Refill     cholecalciferol, vitamin D3, 1,000 unit tablet Take 1 " tablet (1,000 Units total) by mouth daily. 90 tablet 3     cyclobenzaprine (FLEXERIL) 10 MG tablet Take 1 tablet (10 mg total) by mouth 2 (two) times a day as needed for muscle spasms. 180 tablet 1     gabapentin (NEURONTIN) 300 MG capsule Take 2 capsules at bedtime and 1 in the afternoon if needed 270 capsule 1     lisinopril (PRINIVIL,ZESTRIL) 10 MG tablet Take 1 tablet (10 mg total) by mouth daily. 30 tablet 0     No current facility-administered medications for this visit.        Total data points: 1

## 2021-06-01 NOTE — PROGRESS NOTES
ASSESSMENT/PLAN:  Essential hypertension  Add hydrochlorothiazide 12.5 mg.  Continue with lisinopril 10 mg.  Come back in 1 week.  Check blood pressure daily.  Work on healthy lifestyle modifications  -     hydroCHLOROthiazide (MICROZIDE) 12.5 mg capsule; Take 1 capsule (12.5 mg total) by mouth daily.    Chronic nonintractable headache, unspecified headache type  Increase gabapentin.  Avoid excessive caffeine.  Follow-up in 1 week.  May need CT scan if not better  -     gabapentin (NEURONTIN) 300 MG capsule; Take 1 capsule in the morning, 1 in the afternoon, and 2 capsules at bedtime      CHIEF COMPLAINT:  Chief Complaint   Patient presents with     Follow-up     BP still high       HISTORY OF PRESENT ILLNESS:  Jeannine is a 29 y.o. female presenting to the clinic today for a blood pressure follow up. She is accompanied by her mom.      Hypertension: She was seen in clinic on 9/18 for fluctuating blood pressures. She was started on 10 mg lisinopril. She has been monitoring her blood pressures every morning. Her systolics have been between 131-156 but mostly in the 140's. Her diasytolics have been mostly in the 100's. Her blood pressure in clinic today is 126/98mmHg. She inquires about the best time to take her medication. Mom inquires about possible causes for high blood pressure.     Headaches: She has been having intermittent headaches since 9/18. She says that the toradol 30 mg injection she received on 9/18 has not alleviated her pain. The headaches are mostly in the forehead. She grades her pain at an 8/10. She is still taking gabapentin. She has been taking one in the afternoon and two at night.     REVIEW OF SYSTEMS:   Constitutional: Negative.   HENT: Negative.   Eyes: Negative.   Respiratory: Negative.   Cardiovascular: Negative.   Gastrointestinal: Negative.   Endocrine: Negative.   Genitourinary: Negative.   Musculoskeletal: Negative.   Skin: Negative.   Allergic/Immunologic: Negative.   Neurological:  "Negative.   Hematological: Negative.   Psychiatric/Behavioral: Negative.   All other systems are negative.    TOBACCO USE:  Social History     Tobacco Use   Smoking Status Passive Smoke Exposure - Never Smoker   Smokeless Tobacco Never Used       VITALS:  Vitals:    09/25/19 1424 09/25/19 1443   BP: (!) 126/98 (!) 128/98   Patient Site: Left Arm    Patient Position: Sitting    Cuff Size: Adult Regular    Pulse: (!) 106    SpO2: 98%    Weight: 146 lb 1 oz (66.3 kg)    Height: 5' 2\" (1.575 m)      Wt Readings from Last 3 Encounters:   09/25/19 146 lb 1 oz (66.3 kg)   09/18/19 144 lb (65.3 kg)   09/17/19 144 lb 6 oz (65.5 kg)       PHYSICAL EXAM:  Constitutional: Patient is oriented to person, place, and time. Patient appears well-developed and well-nourished. No distress.   Head: Normocephalic and atraumatic.   Right Ear: External ear normal.   Left Ear: External ear normal.   Nose: Nose normal.   Eyes: Conjunctivae and EOM are normal. Right eye exhibits no discharge. Left eye exhibits no discharge. No scleral icterus.   Neurological: Patient is alert and oriented to person, place, and time. No cranial nerve deficit. Coordination normal.   Skin: No rash noted. Patient is not diaphoretic. No erythema. No pallor.    ADDITIONAL HISTORY SUMMARIZED (2): None.  DECISION TO OBTAIN EXTRA INFORMATION (1): None.   RADIOLOGY TESTS (1): None.  LABS (1): Reviewed labs from 9/18/19.   MEDICINE TESTS (1): None.  INDEPENDENT REVIEW (2 each): None.     Jania HERNANDEZ,  am scribing for and in the presence of, Dr. Heller.    Dr. Pina HERNANDEZ, personally performed the services described in this documentation, as scribed by Jania Morales in my presence, and it is both accurate and complete.    MEDICATIONS:  Current Outpatient Medications   Medication Sig Dispense Refill     cholecalciferol, vitamin D3, 1,000 unit tablet Take 1 tablet (1,000 Units total) by mouth daily. 90 tablet 3     cyclobenzaprine (FLEXERIL) 10 MG tablet Take 1 tablet " (10 mg total) by mouth 2 (two) times a day as needed for muscle spasms. 180 tablet 1     gabapentin (NEURONTIN) 300 MG capsule Take 1 capsule in the morning, 1 in the afternoon, and 2 capsules at bedtime 120 capsule 1     lisinopril (PRINIVIL,ZESTRIL) 10 MG tablet Take 1 tablet (10 mg total) by mouth daily. 30 tablet 0     hydroCHLOROthiazide (MICROZIDE) 12.5 mg capsule Take 1 capsule (12.5 mg total) by mouth daily. 30 capsule 0     No current facility-administered medications for this visit.        Total data points: 1

## 2021-06-01 NOTE — PROGRESS NOTES
ASSESSMENT/PLAN:  Herniated Lumbar Disc with associated radiculopathy and sacroiliac joint dysfunction bilaterally  I will refer to orthopedic surgery.  He was seen in the spine clinic because of change over with insurance coverage, will refer him to orthopedic surgery.  I will increase flexible to 10 mg twice daily as needed.  She will continue with gabapentin 900 mg/day.  Further management will depend on orthopedic  -     gabapentin (NEURONTIN) 300 MG capsule; Take 2 capsules at bedtime and 1 in the afternoon if needed  -     cyclobenzaprine (FLEXERIL) 10 MG tablet; Take 1 tablet (10 mg total) by mouth 2 (two) times a day as needed for muscle spasms.  -     Ambulatory referral to Orthopedic Surgery    Primary insomnia  Trazodone has no longer been helpful.  She will try melatonin 10 mg    Elevated blood pressure reading without diagnosis of hypertension  Come back in 1 month for blood pressure recheck.  Spoke about healthy lifestyle modifications including reduction in her soda pop intake and sodium intake    Screening for diabetes mellitus  -     Glycosylated Hemoglobin A1c    Other orders  -     Influenza, Recombinant, Inj, Quadrivalent, PF, 18+YRS    Orders Placed This Encounter   Procedures     Influenza, Recombinant, Inj, Quadrivalent, PF, 18+YRS     Glycosylated Hemoglobin A1c     [facility]     Ambulatory referral to Orthopedic Surgery     Referral Priority:   Routine     Referral Type:   Surgical     Referral Reason:   Evaluation and Treatment     Referral Location:   [facility]     Requested Specialty:   [facility]     Number of Visits Requested:   1           CHIEF COMPLAINT:  Chief Complaint   Patient presents with     discuss meds     Back Pain     lower back pain, shooting pain going down both legs     blood work     A1C     Flu Vaccine       HISTORY OF PRESENT ILLNESS:  Jeannine is a 29 y.o. female presenting to the clinic today with back pain and finger pain. She is  accompanied by her mom, Linette. She does not use the albuterol anymore.     Back Pain: She has been taking 2 gabapentin before bed for her back pain. The shooting down her legs and spasms in her back have been worsening. The shooting pain occurs bilaterally but not at the same time. The pain will occasionally be above the knee or in her calf. The pain is random and sporadic. She said that the spine clinic is no longer on her insurance. She requests a referral for a new location and asks about increasing her Flexeril to twice a day as needed.     Pain in Fingers: She has been getting a lot of pain in her fingers. She does a lot of typing, according to her mom. 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 She is worried about developing arthritis in her fingers.     Sleep Difficulties: She did not notice any improvement with the trazodone. She accidentally missed a few nights and did not notice any side effects. Therefore, she just stopped taking it entirely. She was sleeping okay for the first few nights but is having some difficulties again.     REVIEW OF SYSTEMS:   Constitutional: Negative.   HENT: Negative.   Eyes: Negative.   Respiratory: Negative.   Cardiovascular: Negative.   Gastrointestinal: Negative.   Endocrine: Negative.   Genitourinary: Negative.   Musculoskeletal: Negative.   Skin: Negative.   Allergic/Immunologic: Negative.   Neurological: Negative.   Hematological: Negative.   Psychiatric/Behavioral: Negative.   All other systems are negative.    PFSH:  Her mom and sister have diabetes. She has started drinking a couple cans of pop a day. She gets really bad headaches when she does not drink it.     TOBACCO USE:  Social History     Tobacco Use   Smoking Status Passive Smoke Exposure - Never Smoker   Smokeless Tobacco Never Used       VITALS:  Vitals:    09/17/19 1033 09/17/19 1057   BP: (!) 160/102 (!) 142/108   Patient Site: Left Arm    Patient Position:  Sitting    Cuff Size: Adult Regular    Pulse: 80    Weight: 144 lb 6 oz (65.5 kg)      Wt Readings from Last 3 Encounters:   09/17/19 144 lb 6 oz (65.5 kg)   03/19/19 149 lb 8 oz (67.8 kg)   12/11/18 148 lb 7 oz (67.3 kg)       PHYSICAL EXAM:  Constitutional: Patient is oriented to person, place, and time. Patient appears well-developed and well-nourished. No distress.   Head: Normocephalic and atraumatic.   Right Ear: External ear normal.   Left Ear: External ear normal.   Nose: Nose normal.   Eyes: Conjunctivae and EOM are normal. Right eye exhibits no discharge. Left eye exhibits no discharge. No scleral icterus.   Neurological: Patient is alert and oriented to person, place, and time. No cranial nerve deficit. Coordination normal.   Skin: No rash noted. Patient is not diaphoretic. No erythema. No pallor.      ADDITIONAL HISTORY SUMMARIZED (2): None.  DECISION TO OBTAIN EXTRA INFORMATION (1): None.   RADIOLOGY TESTS (1): None.  LABS (1): Ordered Glycosylated Hemoglobin A1c today.   MEDICINE TESTS (1): None.  INDEPENDENT REVIEW (2 each): None.     IJania,  am scribing for and in the presence of, Dr. Heller.    IDr. Heller, personally performed the services described in this documentation, as scribed by Jania Morales in my presence, and it is both accurate and complete.    MEDICATIONS:  Current Outpatient Medications   Medication Sig Dispense Refill     cholecalciferol, vitamin D3, 1,000 unit tablet Take 1 tablet (1,000 Units total) by mouth daily. 90 tablet 3     cyclobenzaprine (FLEXERIL) 10 MG tablet Take 1 tablet (10 mg total) by mouth 2 (two) times a day as needed for muscle spasms. 180 tablet 1     gabapentin (NEURONTIN) 300 MG capsule Take 2 capsules at bedtime and 1 in the afternoon if needed 270 capsule 1     No current facility-administered medications for this visit.        Total data points: 1

## 2021-06-01 NOTE — PROGRESS NOTES
"ASSESSMENT/PLAN:  Essential hypertension  Good control on lisinopril 10 mg and hydrochlorothiazide 12.5 mg.  Recheck potassium via BMP.  Follow-up in 1 month.  -     Basic Metabolic Panel      Orders Placed This Encounter   Procedures     Basic Metabolic Panel       CHIEF COMPLAINT:  Chief Complaint   Patient presents with     Follow-up     BP     Medication Refill     BP       HISTORY OF PRESENT ILLNESS:  Jeannine is a 29 y.o. female presenting to the clinic today for a blood pressure follow up. She is accompanied by her father.     Fluctuating Blood Pressure: Her blood pressure is doing better than it was previously. Her systolic at home has been between 114-123. Her diastolic at home has been in the 80's. She is taking HCTZ 12.5mg along with lisinopril 10 mg. She takes them both right away in the morning. Her blood pressure in clinic today is 108/72 mmHg. However, she is still not feeling her best. She does not workout, however, she is very active. She is often taking care of her mom and her nieces and nephews.     REVIEW OF SYSTEMS:   Constitutional: Negative.   HENT: Negative.   Eyes: Negative.   Respiratory: Negative.   Cardiovascular: Negative.   Gastrointestinal: Negative.   Endocrine: Negative.   Genitourinary: Negative.   Musculoskeletal: Negative.   Skin: Negative.   Allergic/Immunologic: Negative.   Neurological: Negative.   Hematological: Negative.   Psychiatric/Behavioral: Negative.   All other systems are negative.    TOBACCO USE:  Social History     Tobacco Use   Smoking Status Passive Smoke Exposure - Never Smoker   Smokeless Tobacco Never Used       VITALS:  Vitals:    10/01/19 1016   BP: 108/72   Patient Site: Left Arm   Patient Position: Sitting   Cuff Size: Adult Regular   Pulse: (!) 104   Weight: 144 lb (65.3 kg)   Height: 5' 2\" (1.575 m)     Wt Readings from Last 3 Encounters:   10/01/19 144 lb (65.3 kg)   09/25/19 146 lb 1 oz (66.3 kg)   09/18/19 144 lb (65.3 kg)       PHYSICAL " EXAM:  Constitutional: Patient is oriented to person, place, and time. Patient appears well-developed and well-nourished. No distress.   Head: Normocephalic and atraumatic.   Right Ear: External ear normal.   Left Ear: External ear normal.   Nose: Nose normal.   Eyes: Conjunctivae and EOM are normal. Right eye exhibits no discharge. Left eye exhibits no discharge. No scleral icterus.   Neurological: Patient is alert and oriented to person, place, and time. No cranial nerve deficit. Coordination normal.   Skin: No rash noted. Patient is not diaphoretic. No erythema. No pallor.    QUALITY MEASURES:  The following high BMI interventions were performed this visit: encouragement to exercise and exercise promotion: walking/step counting    ADDITIONAL HISTORY SUMMARIZED (2): Reviewed note from 9/18/19 about fluctuating blood pressure.  DECISION TO OBTAIN EXTRA INFORMATION (1): None.   RADIOLOGY TESTS (1): None.  LABS (1): Reviewed labs from 9/18/19 and ordered labs today.   MEDICINE TESTS (1): None.  INDEPENDENT REVIEW (2 each): None.     The visit lasted a total of 13 minutes face to face with the patient. Over 50% of the time was spent counseling and educating the patient about elevated blood pressure.    I, Jania Morales, am scribing for and in the presence of, Dr. Heller.    I, Dr. Heller, personally performed the services described in this documentation, as scribed by Jania Morales in my presence, and it is both accurate and complete.    MEDICATIONS:  Current Outpatient Medications   Medication Sig Dispense Refill     cholecalciferol, vitamin D3, 1,000 unit tablet Take 1 tablet (1,000 Units total) by mouth daily. 90 tablet 3     cyclobenzaprine (FLEXERIL) 10 MG tablet Take 1 tablet (10 mg total) by mouth 2 (two) times a day as needed for muscle spasms. 180 tablet 1     gabapentin (NEURONTIN) 300 MG capsule Take 1 capsule in the morning, 1 in the afternoon, and 2 capsules at bedtime 120 capsule 1      hydroCHLOROthiazide (MICROZIDE) 12.5 mg capsule Take 1 capsule (12.5 mg total) by mouth daily. 30 capsule 0     lisinopril (PRINIVIL,ZESTRIL) 10 MG tablet Take 1 tablet (10 mg total) by mouth daily. 30 tablet 0     No current facility-administered medications for this visit.        Total data points: 3

## 2021-06-02 VITALS — WEIGHT: 148.44 LBS | BODY MASS INDEX: 27.32 KG/M2 | HEIGHT: 62 IN

## 2021-06-02 VITALS — WEIGHT: 149.5 LBS | BODY MASS INDEX: 27.34 KG/M2

## 2021-06-02 NOTE — TELEPHONE ENCOUNTER
Refill request for medication: lisinopril (PRINIVIL,ZESTRIL) 10 MG tablet  Last visit addressing this medication: 10/01/2019  Follow up plan Return in about 1 month (around 11/1/2019).     Last refill on 9/18/2019, quantity #30     Appointment: Not due     Tricia Hauser, [facility]      Please also see the below message in regards to Gabapentin.

## 2021-06-03 VITALS
DIASTOLIC BLOOD PRESSURE: 98 MMHG | HEART RATE: 106 BPM | HEIGHT: 62 IN | WEIGHT: 146.06 LBS | BODY MASS INDEX: 26.88 KG/M2 | OXYGEN SATURATION: 98 % | SYSTOLIC BLOOD PRESSURE: 128 MMHG

## 2021-06-03 VITALS
WEIGHT: 144 LBS | HEIGHT: 62 IN | SYSTOLIC BLOOD PRESSURE: 108 MMHG | HEART RATE: 104 BPM | DIASTOLIC BLOOD PRESSURE: 72 MMHG | BODY MASS INDEX: 26.5 KG/M2

## 2021-06-03 VITALS
HEART RATE: 80 BPM | BODY MASS INDEX: 26.41 KG/M2 | DIASTOLIC BLOOD PRESSURE: 108 MMHG | SYSTOLIC BLOOD PRESSURE: 142 MMHG | WEIGHT: 144.38 LBS

## 2021-06-03 VITALS
WEIGHT: 144 LBS | HEIGHT: 62 IN | SYSTOLIC BLOOD PRESSURE: 134 MMHG | HEART RATE: 88 BPM | BODY MASS INDEX: 26.5 KG/M2 | DIASTOLIC BLOOD PRESSURE: 120 MMHG

## 2021-06-03 NOTE — PROGRESS NOTES
"ASSESSMENT/PLAN:  Essential hypertension  Hold hydrochlorothiazide and continue with lisinopril 10 mg, due to low blood pressure.  Follow-up in 3 months    CHIEF COMPLAINT:  Chief Complaint   Patient presents with     Follow-up     blood pressure       HISTORY OF PRESENT ILLNESS:  Jeannine is a 29 y.o. female presenting to the clinic today for a hypertension follow up. She is accompanied by her mother. She has been taking HCTZ 12.5 mg once a day and lisinopril 10 mg once a day.Her blood pressure today in clinic is 90/50 mmHg. She noticed that if she takes them together every day, her blood pressure will drop. To prevent this drop, she has been taking them both on one day and then skipping both medications for a day. She denies any chest pain or shortness of breath.     REVIEW OF SYSTEMS:   Constitutional: Negative.   HENT: Negative.   Eyes: Negative.   Respiratory: Negative.   Cardiovascular: Negative.   Gastrointestinal: Negative.   Endocrine: Negative.   Genitourinary: Negative.   Musculoskeletal: Negative.   Skin: Negative.   Allergic/Immunologic: Negative.   Neurological: Negative.   Hematological: Negative.   Psychiatric/Behavioral: Negative.   All other systems are negative.    TOBACCO USE:  Social History     Tobacco Use   Smoking Status Passive Smoke Exposure - Never Smoker   Smokeless Tobacco Never Used       VITALS:  Vitals:    12/02/19 1459   BP: 90/50   Patient Site: Left Arm   Patient Position: Sitting   Cuff Size: Adult Regular   Pulse: (!) 104   Temp: 97.5  F (36.4  C)   TempSrc: Oral   Weight: 147 lb (66.7 kg)   Height: 5' 1.5\" (1.562 m)     Wt Readings from Last 3 Encounters:   12/02/19 147 lb (66.7 kg)   10/01/19 144 lb (65.3 kg)   09/25/19 146 lb 1 oz (66.3 kg)       PHYSICAL EXAM:  Constitutional: Patient is oriented to person, place, and time. Patient appears well-developed and well-nourished. No distress.   Head: Normocephalic and atraumatic.   Right Ear: External ear normal.   Left Ear: External " ear normal.   Nose: Nose normal.   Eyes: Conjunctivae and EOM are normal. Right eye exhibits no discharge. Left eye exhibits no discharge. No scleral icterus.   Neurological: Patient is alert and oriented to person, place, and time. No cranial nerve deficit. Coordination normal.   Skin: No rash noted. Patient is not diaphoretic. No erythema. No pallor.    ADDITIONAL HISTORY SUMMARIZED (2): None.  DECISION TO OBTAIN EXTRA INFORMATION (1): None.   RADIOLOGY TESTS (1): None.  LABS (1): None.  MEDICINE TESTS (1): None.  INDEPENDENT REVIEW (2 each): None.     Jania HERNANDEZ,  am scribing for and in the presence of, Dr. Heller.    I, Dr. Heller, personally performed the services described in this documentation, as scribed by Jania Morales in my presence, and it is both accurate and complete.    MEDICATIONS:  Current Outpatient Medications   Medication Sig Dispense Refill     cholecalciferol, vitamin D3, 1,000 unit tablet Take 1 tablet (1,000 Units total) by mouth daily. 90 tablet 3     cyclobenzaprine (FLEXERIL) 10 MG tablet Take 1 tablet (10 mg total) by mouth 2 (two) times a day as needed for muscle spasms. 180 tablet 1     gabapentin (NEURONTIN) 300 MG capsule Take 1 capsule in the morning, 1 in the afternoon, and 2 capsules at bedtime 120 capsule 1     hydroCHLOROthiazide (MICROZIDE) 12.5 mg capsule Take 1 capsule (12.5 mg total) by mouth daily. 30 capsule 0     lisinopril (PRINIVIL,ZESTRIL) 10 MG tablet Take 1 tablet (10 mg total) by mouth daily. 90 tablet 0     No current facility-administered medications for this visit.        Total data points:0

## 2021-06-03 NOTE — TELEPHONE ENCOUNTER
Refill request for medication: hydroCHLOROthiazide (MICROZIDE) 12.5 mg capsule  Last visit addressing this medication: 10/01/2019  Follow up plan 1  month  Last refill on 9/25/2019, quantity #30     Appointment: No appointments scheduled at this time.     Tricia Hauser, [facility]

## 2021-06-04 VITALS
WEIGHT: 147 LBS | HEART RATE: 104 BPM | TEMPERATURE: 97.5 F | BODY MASS INDEX: 27.05 KG/M2 | SYSTOLIC BLOOD PRESSURE: 90 MMHG | HEIGHT: 62 IN | DIASTOLIC BLOOD PRESSURE: 50 MMHG

## 2021-06-04 VITALS — HEIGHT: 62 IN | BODY MASS INDEX: 26.68 KG/M2 | WEIGHT: 145 LBS

## 2021-06-04 NOTE — TELEPHONE ENCOUNTER
Refill request for medication: cholecalciferol, vitamin D3, 1,000 unit tablet  Last visit addressing this medication: 12/11/2018  Follow up plan Return in about 1 year (around 12/11/2019).     Last refill on 12/11/2018, quantity #90     Appointment: No appointments scheduled at this time.     Tricia Hauser, [facility]

## 2021-06-04 NOTE — TELEPHONE ENCOUNTER
Refill request for medication: gabapentin (NEURONTIN) 300 MG capsule  Last visit addressing this medication: 09/25/2019  Follow up plan Return for if symptoms worsen or fail to improve.     Last refill on 10/16/2019, quantity 120 capsule     Appointment: No appointments scheduled at this time     Tricia Hauser [facility]

## 2021-06-05 ENCOUNTER — HEALTH MAINTENANCE LETTER (OUTPATIENT)
Age: 31
End: 2021-06-05

## 2021-06-06 NOTE — TELEPHONE ENCOUNTER
Refill request for medication: lisinopril (PRINIVIL,ZESTRIL) 10 MG tablet  Last visit addressing this medication: 12/02/2019  Follow up plan 3  months  Last refill on 10/16/2019, quantity #90       Appointment: Not due     Tricia Hauser [facility]

## 2021-06-06 NOTE — TELEPHONE ENCOUNTER
Refill request for medication: gabapentin (NEURONTIN) 300 MG capsule  Last visit addressing this medication: 09/25/2019  Follow up plan Follow-up in 1 week.      Last refill on 01/06/2020, quantity 120     Appointment: No appointments scheduled at this time     Tricia Hauser [facility]

## 2021-06-07 NOTE — TELEPHONE ENCOUNTER
Refill request for medication: Gabapentin  Last visit addressing this medication: 9/25/19  Follow up plan: Unknown  Last refill on 3/5/20, quantity #120   CSA completed n/a   checked  04/06/20, last dispensed refill 3/5/20    Appointment: Not due     Mayda Machuca LPN

## 2021-06-08 NOTE — PROGRESS NOTES
ASSESSMENT/PLAN:  1. Insomnia  Increase trazodone to 100 mg.  We spoke about sleep hygiene and sleep habits.  No electronic or stimulation of the 8 PM and lights out at 9 PM.  She was instructed to also wake up around 8 am every day.    - Basic Metabolic Panel    2. Diabetes mellitus screening  She has significant family history of diabetes which includes mom, dad, sister, extended family members  - Glycosylated Hemoglobin A1c  - Basic Metabolic Panel    3. Encounter for vitamin deficiency screening  - Vitamin D, Total (25-Hydroxy)    4. H/o Nephrolithiasis  -Urinalysis    5.  chronic back pain  She will go back to see the spine clinic for management of her back      Orders Placed This Encounter   Procedures     Glycosylated Hemoglobin A1c     Basic Metabolic Panel     Vitamin D, Total (25-Hydroxy)     Urinalysis           CHIEF COMPLAINT:  Chief Complaint   Patient presents with     blood work     A1C, Vit D, wants to check also her Kidney function     insomnia     Trazodone is not working       HISTORY OF PRESENT ILLNESS:  Jeannine is a 26 y.o. female presenting to the clinic today for a follow up for insomnia. Accompanied by mom. She feels like trazodone is not helping her. She has been taking 50mg of trazodone. She took it last night around midnight, and could not fall asleep until 2am, and kept waking up during the night. She notes that she did watch a movie before she went to bed. She feels like she does not sleep as of late. Insomnia does run in her family.     Chronic Pain: She feels like it is time to go back to the pain clinic. She feels like flexeril and gabapentin are not helping her. She feels like her back is starting to bother her again.     Family History of Diabetes: She has an extended family history of diabetes in her sister and mother and father. She admits to drinking a significant amount of soda every day. 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 She would like an A1c check. Her mom checked her blood sugar randomly during the day, and it was 102. Per mom, she eats a lot of junk food and sweets. She also enjoys baking. Her sister was diagnosed with diabetes in her late 20s.     REVIEW OF SYSTEMS:   All other systems are negative.    PFSH:  No new history.     TOBACCO USE:  History   Smoking Status     Passive Smoke Exposure - Never Smoker   Smokeless Tobacco     Not on file       VITALS:  Vitals:    01/30/17 1256   BP: 120/66   Patient Site: Left Arm   Patient Position: Sitting   Cuff Size: Adult Regular   Pulse: 68   Weight: 127 lb 1 oz (57.6 kg)     Wt Readings from Last 3 Encounters:   01/30/17 127 lb 1 oz (57.6 kg)   04/13/16 125 lb (56.7 kg)   03/30/16 121 lb (54.9 kg)       PHYSICAL EXAM:  Constitutional: Patient is oriented to person, place, and time. Patient appears well-developed and well-nourished. No distress.   Cardiovascular: Normal rate.  Pulmonary/Chest: Effort normal. No respiratory distress.   Neurological: Patient is alert and oriented to person, place, and time.      Results for orders placed or performed in visit on 01/30/17   Glycosylated Hemoglobin A1c   Result Value Ref Range    Hemoglobin A1c 4.9 3.5 - 6.1 %         ADDITIONAL HISTORY SUMMARIZED (2): None.  DECISION TO OBTAIN EXTRA INFORMATION (1): None.   RADIOLOGY TESTS (1): None.  LABS (1): Ordered labs today.  MEDICINE TESTS (1): None.  INDEPENDENT REVIEW (2 each): None.     The visit lasted a total of 15 minutes face to face with the patient. Over 50% of the time was spent counseling and educating the patient about insomnia management.    IEladia, am scribing for and in the presence of, Dr. Heller.    IDr. Heller, personally performed the services described in this documentation, as scribed by Eladia Hernandez in my presence, and it is both accurate and complete.    MEDICATIONS:  Current Outpatient Prescriptions   Medication Sig  Dispense Refill     cholecalciferol, vitamin D3, 1,000 unit tablet Take 1,000 Units by mouth daily.       cyclobenzaprine (FLEXERIL) 5 MG tablet TAKE 1 TO 2 TABLETS BY MOUTH NIGHTLY AT BEDTIME AS NEEDED FOR PAIN OR SPASMS 60 tablet 3     gabapentin (NEURONTIN) 300 MG capsule TAKE TWO CAPSULES BY MOUTH THREE TIMES DAILY 180 capsule 4     traZODone (DESYREL) 50 MG tablet TAKE ONE TABLET AT BEDTIME-Please Schedule Clinic Appointment 90 tablet 0     No current facility-administered medications for this visit.

## 2021-06-08 NOTE — PROGRESS NOTES
"Jeannine Carvalho is a 29 y.o. female who is being evaluated via a billable video visit.      The patient has been notified of following:     \"This video visit will be conducted via a call between you and your physician/provider. We have found that certain health care needs can be provided without the need for an in-person physical exam.  This service lets us provide the care you need with a video conversation.  If a prescription is necessary we can send it directly to your pharmacy.  If lab work is needed we can place an order for that and you can then stop by our lab to have the test done at a later time.    Video visits are billed at different rates depending on your insurance coverage. Please reach out to your insurance provider with any questions.    If during the course of the call the physician/provider feels a video visit is not appropriate, you will not be charged for this service.\"    Patient has given verbal consent to a Video visit? Yes    Patient would like to receive their AVS by AVS Preference: Josiah.    Patient would like the video invitation sent by: Text to cell phone: [phone]    Will anyone else be joining your video visit? No        Video Start Time: 11:22 AM    Additional provider notes:   Jeannine Carvalho 29 y.o.. female with   Chief Complaint   Patient presents with     Hypertension     F/U to BP medication and refills needed - med seems to be working well        S:  For med check    BP at home 110/85 on lisinopril 10mg  HCTZ was d/c a few months ago due to low BP    Gabapentin 300mg three times a day and flexeril 10mg two times a day for back pain and neuropathy  No new questions or concerns    O:  Constitutional: Patient is oriented to person, place, and time. Patient appears well-developed and well-nourished. No distress.   Head: Normocephalic and atraumatic.   Right Ear: External ear normal.   Left Ear: External ear normal.   Nose: Nose normal.   Eyes: Conjunctivae and EOM are " normal. Right eye exhibits no discharge. Left eye exhibits no discharge. No scleral icterus.   Neurological: Patient is alert and oriented to person, place, and time.   The patient has good eye contact.  No psychomotor retardation or stereotypical behaviors.  Speech was regular rate, regular rhythm, adequate responses.  Mood was stable and affect was congruent mood.  No suicidal or homicidal intent.  No hallucination.      A/P:  Diagnoses and all orders for this visit:    Neuropathy, Herniated Lumbar Disc, Sacroiliac joint dysfunction of both sides, Chronic nonintractable headache, unspecified headache type  Refilled  F/u 6 months  -     gabapentin (NEURONTIN) 300 MG capsule; Take 1 capsule by mouth in the morning, 1 in the afternoon, and 2 capsules at bedtime  Dispense: 270 capsule; Refill: 3    Essential hypertension  Good control  F/u in 6 months  -     lisinopriL (PRINIVIL,ZESTRIL) 10 MG tablet; Take 1 tablet (10 mg total) by mouth daily.  Dispense: 90 tablet; Refill: 3      Video-Visit Details    Type of service:  Video Visit    Video End Time (time video stopped): 11:28 AM  Originating Location (pt. Location): Home    Distant Location (provider location):  [facility]/OB     Platform used for Video Visit: Barbie Reeves MD

## 2021-06-08 NOTE — TELEPHONE ENCOUNTER
Pt informed to schedule an appointment with Dr Heller to discuss med. Pt will call back to schedule

## 2021-06-08 NOTE — PROGRESS NOTES
"Jeannine Carvalho is a 29 y.o. female who is being evaluated via a billable video visit.      The patient has been notified of following:     \"This video visit will be conducted via a call between you and your physician/provider. We have found that certain health care needs can be provided without the need for an in-person physical exam.  This service lets us provide the care you need with a video conversation.  If a prescription is necessary we can send it directly to your pharmacy.  If lab work is needed we can place an order for that and you can then stop by our lab to have the test done at a later time.    Video visits are billed at different rates depending on your insurance coverage. Please reach out to your insurance provider with any questions.    If during the course of the call the physician/provider feels a video visit is not appropriate, you will not be charged for this service.\"    Patient has given verbal consent to a Video visit? Yes    Patient would like to receive their AVS by AVS Preference: Josiah.    Patient would like the video invitation sent by: Text to cell phone: [phone]    Will anyone else be joining your video visit? No        Video Start Time: 2:00 PM    Additional provider notes:     Jeannine Carvalho 29 y.o.. female with   Chief Complaint   Patient presents with     back pain     lower back x fews days pain radiates down both legs        S:  H/o chronic low back pain  4 days ago helping dad lift patio favors from truck  [place] sharp pain in lower back after she reached out to grap a furniture  Pain of the Lower back and radiating to both legs  Hard to move and walk  ibu & flexeril not helping  7/10 rest, 10/10 movement  In change in bowel or bladder function    O:  Constitutional: Patient is oriented to person, place, and time. Patient appears well-developed and well-nourished. No distress.   Head: Normocephalic and atraumatic.   Right Ear: External ear normal.   Left Ear: External " ear normal.   Nose: Nose normal.   Eyes: Conjunctivae and EOM are normal. Right eye exhibits no discharge. Left eye exhibits no discharge. No scleral icterus.   Neurological: Patient is alert and oriented to person, place, and time.   The patient has good eye contact.  No psychomotor retardation or stereotypical behaviors.  Speech was regular rate, regular rhythm, adequate responses.  Mood was stable and affect was congruent mood.  No suicidal or homicidal intent.  No hallucination.      A/P:    Acute bilateral low back pain with bilateral sciatica  Add tramadol two times a day. Continue with ibuprofen 800mg three times a day and flexeril 10mg two times a day  Minimize weight bearing activities  Call early next week if still symptomatic  -     traMADoL (ULTRAM) 50 mg tablet; Take 1 tablet (50 mg total) by mouth 2 (two) times a day.  Dispense: 30 tablet; Refill: 0      Video-Visit Details    Type of service:  Video Visit    Video End Time (time video stopped): 2:25 PM  Originating Location (pt. Location): Home    Distant Location (provider location):  [facility] MEDICINE/OB     Platform used for Video Visit: Barbie Reeves MD

## 2021-06-09 NOTE — PROGRESS NOTES
"Jeannine Carvalho is a 29 y.o. female who is being evaluated via a billable video visit.      The patient has been notified of following:     \"This video visit will be conducted via a call between you and your physician/provider. We have found that certain health care needs can be provided without the need for an in-person physical exam.  This service lets us provide the care you need with a video conversation.  If a prescription is necessary we can send it directly to your pharmacy.  If lab work is needed we can place an order for that and you can then stop by our lab to have the test done at a later time.    Video visits are billed at different rates depending on your insurance coverage. Please reach out to your insurance provider with any questions.    If during the course of the call the physician/provider feels a video visit is not appropriate, you will not be charged for this service.\"    Patient has given verbal consent to a Video visit? Yes    Will anyone else be joining your video visit? No        Video Start Time: 10:32 AM    Additional provider notes:   Jeannine Carvalho 29 y.o.. female with   Chief Complaint   Patient presents with     sun burn     blisters on arms x 3 days        S:  3 days ago sunburn  Didn't use sunscreen  Redness on face  Arms are swollen, red, sore, and with small blisters  Area behind the knees is feeling better    [place] forgot to [place] on sunscreen. As a result of that i got sun burned pretty bad. It is from my shoulders down my arms to the back of my hand. It is also on the back of my legs from above the knees down. It is extremely painful. I have blisters on both of my arms. It has also caused alot of swelling on both arms and both legs. I have also been getting nauseous and the chills. I have tried aloevera, goldbond ultimate healing lotion, and aquaphor healing ointment but nothing has worked.       O:  Constitutional: Patient is oriented to person, " place, and time. Patient appears well-developed and well-nourished. No distress.   Head: Normocephalic and atraumatic.   Right Ear: External ear normal.   Left Ear: External ear normal.   Nose: Nose normal.   Eyes: Conjunctivae and EOM are normal. Right eye exhibits no discharge. Left eye exhibits no discharge. No scleral icterus.   Neurological: Patient is alert and oriented to person, place, and time.   The patient has good eye contact.  No psychomotor retardation or stereotypical behaviors.  Speech was regular rate, regular rhythm, adequate responses.  Mood was stable and affect was congruent mood.  No suicidal or homicidal intent.  No hallucination.  Skin:  Redness along the forehead and nose.  Arms are red and swollen.  I didn't see any blisters    A/P:  Sunburn  Continue with aquaphor ointment 2-3 times daily  Apply ice to help with pain and swelling  May use NSIADs prn for pain   Emphasized importance of sun protection    Video-Visit Details    Type of service:  Video Visit    Video End Time (time video stopped): 10:41 AM  Originating Location (pt. Location): Home    Distant Location (provider location):  [facility] MEDICINE/OB     Platform used for Video Visit: Barbie Reeves MD

## 2021-06-10 NOTE — PROGRESS NOTES
ASSESSMENT: Jeannine Carvalho is a 30 y.o. female with past medical history significant for insomnia, vitamin D deficiency, hypertension who presents today for evaluation of acute on chronic bilateral low back pain at the lumbosacral junction with radiation into the bilateral lower extremities.  Patient has not had any recent advanced imaging of the lumbar spine.  An MRI lumbar spine from March 2016 showed small disc protrusions at L4-5 and L5-S1.  At L4-5 there was mild spinal canal stenosis.  On exam today patient actually seems to be primarily symptomatic from sacroiliac joint dysfunction.  She had reproduction of her pain with SI joint provocative maneuvers x4 on the right and x5 on the left.  Within the differential is lumbar radiculitis.  She was neurologically intact.    DEBBY: 32  Who 5:15    PLAN:  A shared decision making model was used.  The patient's values and choices were respected.  The following represents what was discussed and decided upon by the physician assistant and the patient.      1.  DIAGNOSTIC TESTS: I reviewed the MRI lumbar spine from March 2016.  No further diagnostic tests were ordered.  Patient was neurologically intact and does not have any red flag symptoms.  If symptoms do not improve, we may need to obtain an updated MRI lumbar spine for further evaluation.    2.  PHYSICAL THERAPY:  Discussed the importance of core strengthening, ROM, stretching exercises with the patient and how each of these entities is important in decreasing pain.  Explained to the patient that the purpose of physical therapy is to teach the patient a home exercise program.  These exercises need to be performed every day in order to decrease pain and prevent future occurrences of pain.  Entered a referral for physical therapy.    3.  MEDICATIONS:   -Patient is currently taking gabapentin 600 mg 3 times daily.  This was recently increased by her primary care provider from 600 mg twice daily.  She has not noticed  improvement in her symptoms thus far.  I provided her with the dosage titration chart so that she can increase her dose up to the maximum of 900 mg 3 times daily.  - If that is not helpful, we could have her trial Lyrica instead.  - Patient can continues on Flexeril 10 mg 3 times daily as needed.  This has been helpful for her back pain, but not her leg pain.  -I did tell the patient that she could try using over-the-counter NSAIDs as well.     4.  INTERVENTIONS: No interventions were ordered.  Patient will trial physical therapy and titrating her gabapentin dose higher first.  If she fails to improve, I would recommend bilateral sacroiliac joint injections as the next that.    5.  PATIENT EDUCATION: Patient is in agreement with the above plan.  All questions were answered.    6.  FOLLOW-UP:   I will see the patient back in the clinic in 4 weeks to monitor her progress with physical therapy.  If she still having significant pain, would recommend proceeding with a bilateral sacroiliac joint injections.  If she has questions or concerns in the meantime, she should not visit to call.      SUBJECTIVE:  Jeannine Carvalho  Is a 30 y.o. female who presents today in consultation at the request of Dr. Pina Reeves for new patient evaluation of low back pain with radiation into the bilateral lower extremities.  Patient was last seen in our clinic March 30, 2016.  At that time she is complaining of low back and bilateral leg pain and it had not improved with conservative treatment including lumbar epidural steroid injections and physical therapy.  I referred the patient to see Dr. Swenson.  Dr. Swenson told the patient she would not recommend surgery because she thought her pain was related to sacroiliac joint dysfunction.  Patient states that after that her pain persisted but was manageable.  Unfortunately, 2 months ago she lifted  stones out of a truck and twisted.  She felt immediate pain.  She states that she could barely  move for about a week.  Ever since then, she has had increased shooting pains down both legs.  She saw her primary care provider.  Her gabapentin dose was increased from 600 mg twice daily (has been on this for years) to 600 mg 3 times daily.  She has not found this to be helpful.  Her Flexeril dose was also increased to 3 times daily.  This has helped with her back pain, but not her leg pain.  She was referred to our clinic for further evaluation and treatment.    Patient complains of bilateral low back pain.  Pain is located at the lumbosacral junction.  Pain extends into the buttocks.  She has shooting pains down the lateral thighs.  Occasionally, she feels pain in the lateral calves and plantar feet as well.  Patient describes the upper buttock pain as an aching pain.  The thigh pain is a burning, deep, shooting pain.  Both sides are equally affected.  Symptoms are aggravated with sitting too long and standing too long.  Lying down helps to alleviate the pain.  She denies any numbness, tingling, or weakness down the legs.  Denies any loss of bowel or bladder control.  Denies any recent fevers, chills, sweats.    Patient has had physical therapy in the past.  She had 2 sessions in 2013, 2 sessions in 2014, and 4 sessions in 2016.  She admits she is not doing any home exercises.  She lost her home exercise sheets.  She does not go to a chiropractor.  She has not had any injections since 2015 (lumbar epidural steroid injection, not effective).  She is currently using gabapentin 600 mg 3 times daily which is not helping.  She is using Flexeril 10 mg 3 times daily which helps with her back pain.  She also takes Tylenol as needed.  She has been avoiding NSAIDs due to initial reports of worse outcomes with COVID-19.    Current Outpatient Medications on File Prior to Visit   Medication Sig Dispense Refill     cholecalciferol, vitamin D3, 1,000 unit (25 mcg) tablet Take 1 tablet (1,000 Units total) by mouth daily. 90  tablet 3     cyclobenzaprine (FLEXERIL) 10 MG tablet Take 1 tablet (10 mg total) by mouth 3 (three) times a day as needed for muscle spasms. 270 tablet 1     gabapentin (NEURONTIN) 300 MG capsule Take 2 capsules by mouth in the morning, 2 in the afternoon, and 2 capsules at bedtime 540 capsule 3     lisinopriL (PRINIVIL,ZESTRIL) 10 MG tablet Take 1 tablet (10 mg total) by mouth daily. 90 tablet 3         No Known Allergies    Past Medical History:   Diagnosis Date     Headache      Insomnia      LBP (low back pain)      Lumbar herniated disc      Lumbar radiculitis      Neuropathy      Obesity      Ovarian cyst         Past surgical history: Kidney stone removal    Family history: Parents, sister, grandparents had diabetes.  Grandparents had heart disease.  Parents have hypertension.  Father had a stroke.    Social history: Patient is single.  She lives with her parents and her sister.  She is currently in online school to get her high school diploma.  She plans to go on to get a degree to be a .  She denies tobacco, alcohol, illicit drug use.      ROS: Positive for muscle pain, muscle fatigue, sciatica, insomnia, excessive tiredness, anxiety.  Specifically negative for bowel/bladder dysfunction, fevers,chills, appetite changes, unexplained weight loss.   Otherwise 13 systems reviewed are negative.  Please see the patient's intake questionnaire from today for details.      OBJECTIVE:  PHYSICAL EXAMINATION:    CONSTITUTIONAL:  Vital signs as above.  No acute distress.  The patient is well nourished and well groomed.  PSYCHIATRIC:  The patient is awake, alert, oriented to person, place, time and answering questions appropriately with clear speech.    HEENT: Normocephalic, atraumatic.  Sclera clear.  Neck is supple.  SKIN:  Skin over the face, bilateral lower extremities, and posterior torso is clean, dry, intact without rashes.    GAIT:  Gait is non-antalgic.  The patient is able to heel and toe walk without  significant difficulty.    STANDING EXAMINATION: No tenderness palpation bilateral lower lumbar paraspinous muscles.  Tender to palpation bilateral sacroiliac joints.  Positive Destini finger test bilaterally.  Lumbar range of motion is full.  MUSCLE STRENGTH:  The patient has 5/5 strength for the bilateral hip flexors, knee flexors/extensors, ankle dorsiflexors/plantar flexors, great toe extensors, ankle evertors/invertors.  NEUROLOGICAL:  2/4 patellar, and achilles reflexes bilaterally.  Negative Babinski's bilaterally.  No ankle clonus bilaterally. Sensation to light touch is intact in the bilateral L4, L5, and S1 dermatomes.  VASCULAR:  2/4 dorsalis pedis pulses bilaterally.  Bilateral lower extremities are warm.  There is no pitting edema of the bilateral lower extremities.  ABDOMINAL:  Soft, non-distended, non-tender throughout all quadrants.  No pulsatile mass palpated in the left lower quadrant.  LYMPH NODES:  No palpable or tender inguinal lymph nodes.  MUSCULOSKELETAL:  straight leg raise causes low back pain bilaterally, but no leg pain.  Positive pelvic distraction test.  Positive thigh thrust left, negative thigh thrust right.  Positive Sinan's test bilaterally reproducing pain localizing to the sacroiliac joints.  Positive Gaenslen's test bilaterally.  Positive Yeoman's test bilaterally.    RESULTS: I reviewed the MRI lumbar spine from March 31, 2016.  This shows a small broad-based disc protrusion at L4-5 resulting in mild spinal canal stenosis.  There is also a shallow central disc protrusion L5-S1, but no neural impingement.

## 2021-06-10 NOTE — PROGRESS NOTES
"Jeannine Carvalho is a 30 y.o. female who is being evaluated via a billable video visit.      The patient has been notified of following:     \"This video visit will be conducted via a call between you and your physician/provider. We have found that certain health care needs can be provided without the need for an in-person physical exam.  This service lets us provide the care you need with a video conversation.  If a prescription is necessary we can send it directly to your pharmacy.  If lab work is needed we can place an order for that and you can then stop by our lab to have the test done at a later time.    Video visits are billed at different rates depending on your insurance coverage. Please reach out to your insurance provider with any questions.    If during the course of the call the physician/provider feels a video visit is not appropriate, you will not be charged for this service.\"    Patient has given verbal consent to a Video visit? Yes  How would you like to obtain your AVS? AVS Preference: MyChart.    Will anyone else be joining your video visit? No        Video Start Time: 10:20 AM    Additional provider notes:   Jeannine Carvalho 30 y.o.. female with   Chief Complaint   Patient presents with     Back Pain     wants to up the dose of Flexeril     Medication Refill     Flexeril        S:    Spasm lower back daily  With shooting pain to both legs above knees  No recent injury  No bowel bladder dysfunction  Flexeril two times a day does not seem to be enough  Gabapentin 300mg in the morning, 300mg afternoon, 600mg at bedtime    O:  Constitutional: Patient is oriented to person, place, and time. Patient appears well-developed and well-nourished. No distress.   Head: Normocephalic and atraumatic.   Right Ear: External ear normal.   Left Ear: External ear normal.   Nose: Nose normal.   Eyes: Conjunctivae and EOM are normal. Right eye exhibits no discharge. Left eye exhibits no discharge. No scleral icterus. "   Neurological: Patient is alert and oriented to person, place, and time.   The patient has good eye contact.  No psychomotor retardation or stereotypical behaviors.  Speech was regular rate, regular rhythm, adequate responses.  Mood was stable and affect was congruent mood.  No suicidal or homicidal intent.  No hallucination.      A/P:  Diagnoses and all orders for this visit:    Lumbar radiculopathy  Increase flexeril to 10mg three times a day  Increase gabapentin to 600mg three times a day  Refer to spine clinic  -     Ambulatory referral to [facility]  -     gabapentin (NEURONTIN) 300 MG capsule; Take 2 capsules by mouth in the morning, 2 in the afternoon, and 2 capsules at bedtime  Dispense: 540 capsule; Refill: 3  -     cyclobenzaprine (FLEXERIL) 10 MG tablet; Take 1 tablet (10 mg total) by mouth 3 (three) times a day as needed for muscle spasms.  Dispense: 270 tablet; Refill: 1      Video-Visit Details    Type of service:  Video Visit    Video End Time (time video stopped): 10:40 AM  Originating Location (pt. Location): Home    Distant Location (provider location):  [facility]     Platform used for Video Visit: Barbie Reeves MD

## 2021-06-10 NOTE — PATIENT INSTRUCTIONS - HE
Gabapentin 300mg Dosing Chart    DATE  MORNING AFTERNOON BEDTIME                                                                                                                                  Day 19 2 2 3    Day 20 2 2 3    Day 21 2 2 3    Day 22 3 2 3    Day 23 3 2 3    Day 24 3 2 3    Day 25 3 3 3    Day 26 3 3 3    Day 27 3 3 3     Continue medication, taking 3 capsules three times daily    Please call if you have any questions regarding how to take your medication  Clinic Phone # [phone]

## 2021-06-11 ENCOUNTER — COMMUNICATION - HEALTHEAST (OUTPATIENT)
Dept: FAMILY MEDICINE | Facility: CLINIC | Age: 31
End: 2021-06-11

## 2021-06-11 DIAGNOSIS — G89.29 CHRONIC MIDLINE LOW BACK PAIN WITHOUT SCIATICA: ICD-10-CM

## 2021-06-11 DIAGNOSIS — M54.50 CHRONIC MIDLINE LOW BACK PAIN WITHOUT SCIATICA: ICD-10-CM

## 2021-06-14 NOTE — TELEPHONE ENCOUNTER
RN cannot approve Refill Request    RN can NOT refill this medication med is not covered by policy/route to provider. Last office visit: 12/2/2019 Magda Xiao MD Last Physical: 12/11/2018 Last MTM visit: Visit date not found Last visit same specialty: 12/2/2019 Magda Xiao MD.  Next visit within 3 mo: Visit date not found  Next physical within 3 mo: Visit date not found      Taty Soliz, [facility] Triage/Med Refill 1/5/2021    Requested Prescriptions   Pending Prescriptions Disp Refills     cholecalciferol, vitamin D3, 1,000 unit (25 mcg) tablet [Pharmacy Med Name: Vitamin D3 Oral Tablet 25 MCG] 90 tablet 0     Sig: TAKE ONE TABLET BY MOUTH ONE TIME DAILY       There is no refill protocol information for this order

## 2021-06-14 NOTE — PROGRESS NOTES
[facility] Discharge Summary  Patient Name: Jeannine Carvalho  Date: 1/15/2021  Referral Diagnosis: SIJ dysfunction  Referring provider: Nikole Smith PA-C  Visit Diagnosis:   1. Sacroiliac joint dysfunction         Goals: NOT MET    Patient was seen for 1 visit and did not return to clinic.  The patient discontinued therapy, did not return.  The patient was issued a HEP and instructed in proper usage.  No further therapy is required at this time.    Therapy will be discontinued at this time.  The patient will need a new referral to resume.    Thank you for your referral.  Maggie Yang, PT, DPT  1/15/2021  11:11 AM

## 2021-06-15 ENCOUNTER — RECORDS - HEALTHEAST (OUTPATIENT)
Dept: GENERAL RADIOLOGY | Facility: CLINIC | Age: 31
End: 2021-06-15

## 2021-06-15 ENCOUNTER — OFFICE VISIT - HEALTHEAST (OUTPATIENT)
Dept: FAMILY MEDICINE | Facility: CLINIC | Age: 31
End: 2021-06-15

## 2021-06-15 DIAGNOSIS — M51.26 DISPLACEMENT OF LUMBAR INTERVERTEBRAL DISC WITHOUT MYELOPATHY: ICD-10-CM

## 2021-06-15 DIAGNOSIS — M54.42 LUMBAGO WITH SCIATICA, LEFT SIDE: ICD-10-CM

## 2021-06-15 DIAGNOSIS — M54.41 CHRONIC BILATERAL LOW BACK PAIN WITH BILATERAL SCIATICA: ICD-10-CM

## 2021-06-15 DIAGNOSIS — G89.29 CHRONIC BILATERAL LOW BACK PAIN WITH BILATERAL SCIATICA: ICD-10-CM

## 2021-06-15 DIAGNOSIS — G89.4 CHRONIC PAIN SYNDROME: ICD-10-CM

## 2021-06-15 DIAGNOSIS — G62.9 NEUROPATHY: ICD-10-CM

## 2021-06-15 DIAGNOSIS — R51.9 CHRONIC NONINTRACTABLE HEADACHE, UNSPECIFIED HEADACHE TYPE: ICD-10-CM

## 2021-06-15 DIAGNOSIS — I10 ESSENTIAL HYPERTENSION: ICD-10-CM

## 2021-06-15 DIAGNOSIS — F39 MOOD DISORDER (H): ICD-10-CM

## 2021-06-15 DIAGNOSIS — M54.41 LUMBAGO WITH SCIATICA, RIGHT SIDE: ICD-10-CM

## 2021-06-15 DIAGNOSIS — M53.3 SACROILIAC JOINT DYSFUNCTION OF BOTH SIDES: ICD-10-CM

## 2021-06-15 DIAGNOSIS — G89.29 CHRONIC NONINTRACTABLE HEADACHE, UNSPECIFIED HEADACHE TYPE: ICD-10-CM

## 2021-06-15 DIAGNOSIS — M54.42 CHRONIC BILATERAL LOW BACK PAIN WITH BILATERAL SCIATICA: ICD-10-CM

## 2021-06-15 DIAGNOSIS — G89.29 OTHER CHRONIC PAIN: ICD-10-CM

## 2021-06-15 LAB
ALBUMIN UR-MCNC: NEGATIVE G/DL
APPEARANCE UR: CLEAR
BILIRUB UR QL STRIP: NEGATIVE
COLOR UR AUTO: YELLOW
GLUCOSE UR STRIP-MCNC: NEGATIVE MG/DL
HGB UR QL STRIP: NEGATIVE
KETONES UR STRIP-MCNC: NEGATIVE MG/DL
LEUKOCYTE ESTERASE UR QL STRIP: NEGATIVE
NITRATE UR QL: NEGATIVE
PH UR STRIP: 6 [PH] (ref 5–8)
SP GR UR STRIP: 1.02 (ref 1–1.03)
UROBILINOGEN UR STRIP-ACNC: NORMAL

## 2021-06-15 NOTE — PROGRESS NOTES
ASSESSMENT/PLAN:    Irregular bowel habits  We discussed working on better eating habits, reducing soda pop and junk food  Drink more water and eat more vegetables    Sacroiliac joint dysfunction of both sides  She sees spine clinic  Will refill flexeril  -     cyclobenzaprine (FLEXERIL) 10 MG tablet; Take 2 tabs at bedtime  Dispense: 180 tablet; Refill: 0      CHIEF COMPLAINT:  Chief Complaint   Patient presents with     Irritable Bowel Syndrome     diarrhea and constipation x having for long     Back Pain     radiates  down to legs       HISTORY OF PRESENT ILLNESS:  Jeannine is a 27 y.o. female presenting to the clinic today for concerns for irritable bowel syndrome. Here with her dad today. For a quite some time now, she has been experiencing constipation alternating with looser stools. She says that she can have 5 days without a bowel movement, then have a normal one, and then have looser stools for a few day, and the cycle will start over. Per dad, she does not eat well, and eats a lot of processed food and fast food. Dad also notes that she drinks a significant amount of soda. She feels like she has some lack of appetite during the day, and eats mostly at night. Dad says that she has been weary of her weight recently. She does have a family history of diabetes.     Back Pain: She has back pain, and normally follows with the spine clinic. She has been taking cyclobenzaprine 10mg at night, and this helps her with the pain. She can have some shooting pains down her right leg.     REVIEW OF SYSTEMS:   Constitutional: Negative.   HENT: Negative.   Eyes: Negative.   Respiratory: Negative.   Cardiovascular: Negative.   Endocrine: Negative.   Genitourinary: Negative.   Skin: Negative.   Allergic/Immunologic: Negative.   Neurological: Negative.   Hematological: Negative.   Psychiatric/Behavioral: Negative.   All other systems are negative.    PFSH:  No new history.     TOBACCO USE:  History   Smoking Status      Passive Smoke Exposure - Never Smoker   Smokeless Tobacco     Not on file       VITALS:  Vitals:    02/06/18 1333   BP: 112/66   Pulse: 68   Weight: 146 lb (66.2 kg)     Wt Readings from Last 3 Encounters:   02/06/18 146 lb (66.2 kg)   05/02/17 125 lb (56.7 kg)   01/30/17 127 lb 1 oz (57.6 kg)       PHYSICAL EXAM:  Constitutional: Patient is oriented to person, place, and time. Patient appears well-developed and well-nourished. No distress.   Cardiovascular: Normal rate.  Pulmonary/Chest: Effort normal. No respiratory distress.   Neurological: Patient is alert and oriented to person, place, and time.      Results for orders placed or performed during the hospital encounter of 05/09/17   POCT pregnancy, urine   Result Value Ref Range    POC Preg, Urine Negative Negative    POCt Kit Lot Number [id number]     POCT Kit Expiration Date 6/12/18     Pos Control Valid Control Valid Control    Neg Control Valid Control Valid Control    Dipstick Lot Number [id number]     Dipstick Expiration Date 10/2017     POC Specific Gravity, Urine 1.015            ADDITIONAL HISTORY SUMMARIZED (2): None.  DECISION TO OBTAIN EXTRA INFORMATION (1): None.   RADIOLOGY TESTS (1): None.  LABS (1): None.  MEDICINE TESTS (1): None.  INDEPENDENT REVIEW (2 each): None.     The visit lasted a total of 17 minutes face to face with the patient. Over 50% of the time was spent counseling and educating the patient about her IBS and back pain.    I, Eladia Hernandez, am scribing for and in the presence of, Dr. Heller.    I, Magda Reeves MD , personally performed the services described in this documentation, as scribed by Eladia Hernandez in my presence, and it is both accurate and complete.    MEDICATIONS:  Current Outpatient Prescriptions   Medication Sig Dispense Refill     gabapentin (NEURONTIN) 300 MG capsule Take 1 capsule (300 mg total) by mouth 3 (three) times a day. 180 capsule 4     traZODone (DESYREL) 100 MG tablet Take 1  tablet (100 mg total) by mouth at bedtime. 90 tablet 1     traZODone (DESYREL) 50 MG tablet TAKE ONE TABLET AT BEDTIME-Please Schedule Clinic Appointment 90 tablet 0     cholecalciferol, vitamin D3, 1,000 unit tablet Take 1,000 Units by mouth daily.       cyclobenzaprine (FLEXERIL) 10 MG tablet Take 2 tabs at bedtime 180 tablet 0     No current facility-administered medications for this visit.        Total data points: 0

## 2021-06-16 LAB — BACTERIA SPEC CULT: NO GROWTH

## 2021-06-16 NOTE — PROGRESS NOTES
"Jeannine Carvalho is a 30 y.o. female who is being evaluated via a billable video visit.      How would you like to obtain your AVS? MyChart.  If dropped from the video visit, the video invitation should be resent by: Number in the chart.  Will anyone else be joining your video visit? No       Video Start Time: 11:52 AM    Assessment & Plan     Pain, dental  - acetaminophen-codeine (TYLENOL #3) 300-30 mg per tablet  Dispense: 21 tablet; Refill: 0  Advised to continue to do salt water gargles and use the medication sparingly. Discussed the side effects of the medication.  A  BMI:   Estimated body mass index is 26.52 kg/m  as calculated from the following:    Height as of 8/17/20: 5' 2\" (1.575 m).    Weight as of 8/17/20: 145 lb (65.8 kg).       No follow-ups on file.    Anthony Munoz MD  [facility]    Subjective   Jeannine Carvalho is 30 y.o. and presents today for the following health issues   HPI   Calls in wanting to have some management .She has some dental pain that started as a result of a crack in her teeth. Was eating yesterday and broke off the teeth entirely.Has severe pain that she is unable to control with Tylenol or Ibuprofen.Has an appt to see the Dentist in early April.    Review of Systems  Has no other concerns. Has no bleeding       Objective       Vitals:  No vitals were obtained today due to virtual visit.    Physical Exam   Good eye contact . Some distress due to pain. No notable facial swelling. No signs of difficult breathing No audible wheezing.  Good mentation.         Video-Visit Details    Type of service:  Video Visit    Video End Time (time video stopped): 12.00  Originating Location (pt. Location): Home    Distant Location (provider location):  [facility]     Platform used for Video Visit: Katie"

## 2021-06-16 NOTE — TELEPHONE ENCOUNTER
Left message for patient to call back and schedule a virtual visit or in person visit with a covering provider as Dr. Heller is not in clinic today.

## 2021-06-17 NOTE — TELEPHONE ENCOUNTER
RN cannot approve Refill Request    RN can NOT refill this medication PCP messaged that patient is overdue for Labs and Blood pressure check.. Last office visit: 12/2/2019 Magda Xiao MD Last Physical: 12/11/2018 Last MTM visit: Visit date not found Last visit same specialty: 12/2/2019 Magda Xiao MD.  Next visit within 3 mo: Visit date not found  Next physical within 3 mo: Visit date not found      Laenna Philip, [facility] Triage/Med Refill 5/24/2021    Requested Prescriptions   Pending Prescriptions Disp Refills     lisinopriL (PRINIVIL,ZESTRIL) 10 MG tablet [Pharmacy Med Name: Lisinopril Oral Tablet 10 MG] 90 tablet 0     Sig: TAKE ONE TABLET BY MOUTH ONE TIME DAILY       [employer] Inhibitors Refill Protocol Failed - 5/24/2021 10:54 AM        Failed - Serum Potassium in past 12 months     No results found for: LN-POTASSIUM          Failed - Blood pressure filed in past 12 months     BP Readings from Last 1 Encounters:   12/02/19 90/50             Failed - Serum Creatinine in past 12 months     Creatinine   Date Value Ref Range Status   10/01/2019 0.84 0.60 - 1.10 mg/dL Final             Passed - PCP or prescribing provider visit in past 12 months       Last office visit with prescriber/PCP: 12/2/2019 Magda Xiao MD OR same dept: Visit date not found OR same specialty: 12/2/2019 Magda Xiao MD  Last physical: 12/11/2018 Last MTM visit: Visit date not found   Next visit within 3 mo: Visit date not found  Next physical within 3 mo: Visit date not found  Prescriber OR PCP: Magda Reeves MD  Last diagnosis associated with med order: 1. Essential hypertension  - lisinopriL (PRINIVIL,ZESTRIL) 10 MG tablet [Pharmacy Med Name: Lisinopril Oral Tablet 10 MG]; TAKE ONE TABLET BY MOUTH ONE TIME DAILY   Dispense: 90 tablet; Refill: 0    If protocol passes may refill for 12 months if within 3 months of last provider visit (or a total of 15  months).

## 2021-06-17 NOTE — PATIENT INSTRUCTIONS - HE
Patient Instructions by Josefa Magaña FNP at 3/19/2019 10:30 AM     Author: Josefa Magaña FNP Service: -- Author Type: Nurse Practitioner    Filed: 3/19/2019 11:29 AM Encounter Date: 3/19/2019 Status: Signed    : Josefa Magaña FNP (Nurse Practitioner)       Patient Education     Cough  Your healthcare provider has told you that you have acute bronchitis. Bronchitis is infection or inflammation of the bronchial tubes (airways in the lungs). Normally, air moves easily in and out of the airways. Bronchitis narrows the airways, making it harder for air to flow in and out of the lungs. This causes symptoms such as shortness of breath, coughing up yellow or green mucus, and wheezing. Bronchitis can be acute or chronic. Acute means the condition comes on quickly and goes away in a short time, usually within 3 to 10 days. Chronic means a condition lasts a long time and often comes back.    What causes acute bronchitis?  Acute bronchitis almost always starts as a viral respiratory infection, such as a cold or the flu. Certain factors make it more likely for a cold or flu to turn into bronchitis. These include being very young, being elderly, having a heart or lung problem, or having a weak immune system. Cigarette smoking also makes bronchitis more likely.  When bronchitis develops, the airways become swollen. The airways may also become infected with bacteria. This is known as a secondary infection.  Diagnosing acute bronchitis  Your healthcare provider will examine you and ask about your symptoms and health history. You may also have a sputum culture to test the fluid in your lungs. Chest X-rays may be done to look for infection in the lungs.  Treating acute bronchitis  Bronchitis usually clears up as the cold or flu goes away. You can help feel better faster by doing the following:    Take medicine as directed. You may be told to take ibuprofen or other over-the-counter medicines. These help  relieve inflammation in your bronchial tubes. Your healthcare provider may prescribe an inhaler to help open up the bronchial tubes. Most of the time, acute bronchitis is caused by a viral infection. Antibiotics are usually not prescribed for viral infections.    Drink plenty of fluids, such as water, juice, or warm soup. Fluids loosen mucus so that you can cough it up. This helps you breathe more easily. Fluids also prevent dehydration.    Make sure you get plenty of rest.    Do not smoke. Do not allow anyone else to smoke in your home.  Recovery and follow-up  Follow up with your doctor as you are told. You will likely feel better in a week or two. But a dry cough can linger beyond that time. Let your doctor know if you still have symptoms (other than a dry cough) after 2 weeks, or if youre prone to getting bronchial infections. Take steps to protect yourself from future infections. These steps include stopping smoking and avoiding tobacco smoke, washing your hands often, and getting a yearly flu shot.  When to call your healthcare provider  Call the healthcare provider if you have any of the following:    Fever of 100.4 F (38.0 C) or higher, or as advised    Symptoms that get worse, or new symptoms    Trouble breathing    Symptoms that dont start to improve within a week, or within 3 days of taking antibiotics   Date Last Reviewed: 12/1/2016 2000-2017 [employer]. [address] [place] [address], [place] [address]. All rights reserved. This information is not intended as a substitute for professional medical care. Always follow your healthcare professional's instructions.

## 2021-06-17 NOTE — TELEPHONE ENCOUNTER
Please assist the patient with a face-face appointment with me.  Ok to double book.  Thank you.  Have an awesome day.

## 2021-06-18 NOTE — PATIENT INSTRUCTIONS - HE
Patient Instructions by Maggie Yang PT at 9/1/2020 11:30 AM     Author: Maggie Yang PT Service: -- Author Type: Physical Therapist    Filed: 9/1/2020 11:58 AM Encounter Date: 9/1/2020 Status: Signed    : Maggie Yang PT (Physical Therapist)       It is recommended that you ice 2-3x/day for 20 minutes at a time. Remember to not apply ice directly on skin.     SINGLE KNEE TO CHEST STRETCH - SKTC    While Lying on your back,  hold your knee and gently pull it up towards your chest. Hold for 30 seconds. Do 2-3 repetitions on each side. Perform 1-2x/day      PIRIFORMIS STRETCH - MODIFIED    While lying on your back, hold your knee with your opposite hand and draw your knee up and over towards your opposite shoulder. Hold for 30 seconds. Do 2-3 repetitions on each side. Perform 1-2x/day      DOUBLE KNEE TO CHEST STRETCH - DKTC    While Lying on your back,  hold your knees and gently pull them up towards your chest. Hold for 30 seconds. Do 2-3 repetitions on each side. Perform 1-2x/day         HAMSTRING STRETCH - SUPINE    While lying on your back, raise up your leg and hold the back of your knee until a stretch is felt. Hold for 30 seconds. Do 2-3 repetitions on each side. Perform 1-2x/day

## 2021-06-19 ENCOUNTER — COMMUNICATION - HEALTHEAST (OUTPATIENT)
Dept: FAMILY MEDICINE | Facility: CLINIC | Age: 31
End: 2021-06-19

## 2021-06-19 DIAGNOSIS — M51.26 DISPLACEMENT OF LUMBAR INTERVERTEBRAL DISC WITHOUT MYELOPATHY: ICD-10-CM

## 2021-06-19 DIAGNOSIS — M53.3 SACROILIAC JOINT DYSFUNCTION OF BOTH SIDES: ICD-10-CM

## 2021-06-19 NOTE — LETTER
Letter by Alivia Maxwell at      Author: Alivia Maxwell Service: -- Author Type: --    Filed:  Encounter Date: 10/31/2019 Status: Signed         Jeannine CHIANG Deven  [address] [place] [address]           10/31/19       Dear Jeannine:      At [facility], we care about your health and well-being.  Your primary care provider is committed to ensuring you receive high quality care and has chosen a network of specialists to assist in providing that care.  Recently Dr. Heller referred you to [facility] for specialty care.    It is important to your overall health to follow through with the referral from your care provider.  Please call [facility] [phone] at your earliest convenience for assistance in scheduling an appointment with the recommended specialist.  If you have already scheduled an appointment, please disregard this notice.  Thank you for choosing the [facility] for your healthcare needs.        Sincerely,        Electronically signed by Alivia Maxwell      Referral Coordinator   [facility]

## 2021-06-22 ENCOUNTER — COMMUNICATION - HEALTHEAST (OUTPATIENT)
Dept: FAMILY MEDICINE | Facility: CLINIC | Age: 31
End: 2021-06-22

## 2021-06-22 DIAGNOSIS — G89.4 CHRONIC PAIN SYNDROME: ICD-10-CM

## 2021-06-22 DIAGNOSIS — I10 ESSENTIAL HYPERTENSION: ICD-10-CM

## 2021-06-22 DIAGNOSIS — F39 MOOD DISORDER (H): ICD-10-CM

## 2021-06-22 DIAGNOSIS — E55.9 VITAMIN D DEFICIENCY: ICD-10-CM

## 2021-06-25 NOTE — PROGRESS NOTES
ASSESSMENT/PLAN:  Jeannine was seen today for follow-up.    Diagnoses and all orders for this visit:    Essential hypertension  Stop lisinopril and start metoprolol.  Metoprolol to help with blood pressure and pulse.  Monitor for any orthostatic dizziness  Follow-up in 1 month for blood pressure recheck  -     metoprolol succinate (TOPROL XL) 25 MG; Take 1 tablet (25 mg total) by mouth daily.  -     Comprehensive Metabolic Panel    Mood disorder (H)  Start Cymbalta for mood and pain  Advised psychotherapy but she was not sure if she will find time  Motivational interviewing was utilized today.  Modified cognitive behavioral therapy was performed with counseling on internal locus of control.  Discussed importance of self care, sleep, nutrition, hydration, exercise, and mindfulness  Follow-up in 1 month  -     DULoxetine (CYMBALTA) 30 MG capsule; Take 1 capsule (30 mg total) by mouth daily.    Chronic pain syndrome  -     DULoxetine (CYMBALTA) 30 MG capsule; Take 1 capsule (30 mg total) by mouth daily.  -     Thyroid Curry    Screening for diabetes mellitus  -     Glycosylated Hemoglobin A1c      SUBJECTIVE:    Jeannine Carvalho is a 30 y.o. female who came in today     Here for BP check  Taking lisinopril 10mg  Gets dizzy when she goes from sitting to standing position    Has been feeling down lately.  She states that she takes care of all her family members health conditions and has very little time left for herself.    Little interest or pleasure in doing things: Several days  Feeling down, depressed, or hopeless: Several days  Trouble falling or staying asleep, or sleeping too much: More than half the days  Feeling tired or having little energy: Several days  Poor appetite or overeating: Several days  Feeling bad about yourself - or that you are a failure or have let yourself or your family down: Several days  Trouble concentrating on things, such as reading the newspaper or watching television: Not at all  Moving  or speaking so slowly that other people could have noticed. Or the opposite - being so fidgety or restless that you have been moving around a lot more than usual: Not at all  Thoughts that you would be better off dead, or of hurting yourself in some way: Not at all  PHQ-9 Total Score: 7  If you checked off any problems, how difficult have these problems made it for you to do your work, take care of things at home, or get along with other people?: Not difficult at all    AMARA-7 Screening Results:  Feeling nervous, anxious or on edge: 0 (2021  1:00 PM)  Not being able to stop or control worry: 0 (2021  1:00 PM)  Worrying too much about different things: 0 (2021  1:00 PM)  Trouble relaxin (2021  1:00 PM)  Being so restless that is is hard to sit still: 0 (2021  1:00 PM)  Becoming easily annnoyed or irritable: 1 (2021  1:00 PM)  Feeling afraid as if something awful might happen: 0 (2021  1:00 PM)  AMARA-7 Total: 2 (2021  1:00 PM)  How difficult did these problems make it for you to do your work, take care of things at home or get along with other people? : Not difficult at all (2021  1:00 PM)  How difficult did these problems make it for you to do your work, take care of things at home or get along with other people? : Not difficult at all (2021  1:00 PM)    Review of Systems (except those mentioned above)  Constitutional: Negative.   HENT: Negative.   Eyes: Negative.   Respiratory: Negative.   Cardiovascular: Negative.   Gastrointestinal: Negative.   Endocrine: Negative.   Genitourinary: Negative.   Musculoskeletal: Negative.   Skin: Negative.   Allergic/Immunologic: Negative.   Neurological: Negative.   Hematological: Negative.   Psychiatric/Behavioral: Negative.     Patient Active Problem List    Diagnosis Date Noted     Essential hypertension 10/01/2019     Sacroiliac joint dysfunction of both sides 2016     Vitamin D deficiency 2016     Insomnia       Headache      Herniated Lumbar Disc      No Known Allergies  Current Outpatient Medications   Medication Sig Dispense Refill     cholecalciferol, vitamin D3, 1,000 unit (25 mcg) tablet Take 1 tablet (1,000 Units total) by mouth daily. 90 tablet 0     cyclobenzaprine (FLEXERIL) 10 MG tablet Take 1 tablet (10 mg total) by mouth 3 (three) times a day as needed for muscle spasms. 270 tablet 1     gabapentin (NEURONTIN) 300 MG capsule Take 3 capsules (900 mg total) by mouth 3 (three) times a day. 270 capsule 3     DULoxetine (CYMBALTA) 30 MG capsule Take 1 capsule (30 mg total) by mouth daily. 30 capsule 0     metoprolol succinate (TOPROL XL) 25 MG Take 1 tablet (25 mg total) by mouth daily. 30 tablet 0     No current facility-administered medications for this visit.      Past Medical History:   Diagnosis Date     Headache      Insomnia      LBP (low back pain)      Lumbar herniated disc      Lumbar radiculitis      Neuropathy      Obesity      Ovarian cyst      No past surgical history on file.  Social History     Socioeconomic History     Marital status: Single     Spouse name: None     Number of children: None     Years of education: None     Highest education level: None   Occupational History     None   Social Needs     Financial resource strain: None     Food insecurity     Worry: None     Inability: None     Transportation needs     Medical: None     Non-medical: None   Tobacco Use     Smoking status: Passive Smoke Exposure - Never Smoker     Smokeless tobacco: Never Used   Substance and Sexual Activity     Alcohol use: None     Drug use: None     Sexual activity: None   Lifestyle     Physical activity     Days per week: None     Minutes per session: None     Stress: None   Relationships     Social connections     Talks on phone: None     Gets together: None     Attends [employer] service: None     Active member of club or organization: None     Attends meetings of clubs or organizations: None     Relationship  status: None     Intimate partner violence     Fear of current or ex partner: None     Emotionally abused: None     Physically abused: None     Forced sexual activity: None   Other Topics Concern     None   Social History Narrative    Patient presented to the ED with her mother 05/09/17     Family History   Problem Relation Age of Onset     Diabetes Mother      Hyperlipidemia Mother      Diabetes Father      Diabetes Sister      Hyperlipidemia Sister          OBJECTIVE:    Vitals:    05/25/21 1308   BP: 90/70   Patient Site: Left Arm   Patient Position: Sitting   Cuff Size: Adult Regular   Pulse: (!) 113   Weight: 144 lb 4.8 oz (65.5 kg)     Body mass index is 26.39 kg/m .    Physical Exam:  Constitutional: Patient is oriented to person, place, and time. Patient appears well-developed and well-nourished. No distress.   Head: Normocephalic and atraumatic.   Right Ear: External ear normal.   Left Ear: External ear normal.   Eyes: Conjunctivae and EOM are normal. Right eye exhibits no discharge. Left eye exhibits no discharge. No scleral icterus.   Neurological: Patient is alert and oriented to person, place, and time.  Coordination normal.   Skin: No rash noted. Patient is not diaphoretic. No erythema. No pallor.    The patient has good eye contact.  No psychomotor retardation or stereotypical behaviors.  Speech was regular rate, regular rhythm, adequate responses.  Mood was stable and affect was congruent mood.  No suicidal or homicidal intent.  No hallucination.      Results for orders placed or performed in visit on 10/01/19   Basic Metabolic Panel   Result Value Ref Range    Sodium 137 136 - 145 mmol/L    Potassium 4.4 3.5 - 5.0 mmol/L    Chloride 97 (L) 98 - 107 mmol/L    CO2 28 22 - 31 mmol/L    Anion Gap, Calculation 12 5 - 18 mmol/L    Glucose 107 70 - 125 mg/dL    Calcium 9.9 8.5 - 10.5 mg/dL    BUN 8 8 - 22 mg/dL    Creatinine 0.84 0.60 - 1.10 mg/dL    GFR MDRD Af Amer >60 >60 mL/min/1.73m2    GFR MDRD Non  Af Amer >60 >60 mL/min/1.73m2

## 2021-06-25 NOTE — PROGRESS NOTES
"Assessment:   1. Aspiration of liquid, initial encounter  2. Cough  - XR Chest 2 Views: Negative  - albuterol (PROAIR HFA;PROVENTIL HFA;VENTOLIN HFA) 90 mcg/actuation inhaler; Inhale 2 puffs every 6 (six) hours as needed for wheezing.  Dispense: 1 each; Refill: 0    Aspiration     Most likely some fluid went into the lungs and illicit the cough. Lung sounds is clear and O2 sat is 100%. Patient has some non-productive cough residual.       Rhinitis   I suspect that this is a post nasal drip that drips down patient's throat and cause some chest congestion and a little tickle that elicit a cough. Provided patient with education on pathophysiology of post nasal drip, care and management for the symptoms.         Plan:   -Explained lack of efficacy of antibiotics in viral disease. Educate patient on inappropriate use of antibiotic   -Saline nasal spray for sinus irrigation  -Vitamin C OTC  -Use humidifier  -Albuterol 90 mcg inhaler (2 puffs every 6 hours) PRN  -Avoid allergens and environmental triggers    -RTC in 5-7 days or as needed if shortness of breath, blood in sputum, change in character of cough, development of fever or chills, inability to maintain nutrition and hydration.        Subjective  Patient is a 38 y.o female present in clinic today for evaluation of possible aspiration pneumonia. Patient reported that she drank soda 2 days ago and the liquid \"went down the wrong way\" causing her to cough and some of the soda liquid came out through her nose. Since this incident, she felt shortness of breath at resting and worsen with exertion. Patient also reported that she feels as though she cannot get a full breath in. Patient c/o of sore throat from frequent coughing. She tried cough drops and 1 tab of her mother  \"left over doxycycline\". Patient reports her father smoke but she denies smoking.          Review of System:  Constitutional: alert, calm without any overt signs of distress.  Skin: denies any " rashes  ENT: Denies nasal congestion and post-nasal drip. Denies dizziness or loss of balance. C/O of sore throat today from coughing . Denies dysphagia or GERD/heartburn.  Cardiovascular: Denies heart palpitation, dizziness, chest pain, orthopnea, or edema  Pulmonary:  Reports some chest congestion and shortness of breath at rest and with exertion. Denies wheezes,   GI: Denies n/v, diarrhea, or abdominal pain.  : Denies dysuria.  Abdominal: Denies gallbladder pain, liver, spleen problem. Denies any hernia.  Neurological: Denies weakness, numbness, paraesthesia, of LOC, or coordination changes.     Objective:  /86   Pulse 89   Wt 149 lb 8 oz (67.8 kg)   SpO2 100%   BMI 27.34 kg/m       General appearance: alert without any sign of distress  Head: Normocephalic, without obvious abnormality, atraumatic  Eyes: conjunctivae and corneas clear. PERRL, EOM's intact. Fundi benign.  Ears: normal tympanic membrane and external ear canals both ears  Nose: Both nostrils has some erythema with clear drainage.  Septum midline.   Throat: lips, mucosa, and tongue normal; teeth and gums normal  Neck:  No carotid bruit, no JVD, supple, symmetrical, trachea midline and thyroid not enlarged, symmetric, no tenderness/mass/nodules  Back: symmetric, no curvature. ROM normal. No CVA tenderness.  Lungs: clear on ascultation, some coughing noted.  No adventitious lung sounds noted.   Chest wall: no tenderness  Heart: regular rate and rhythm, S1, S2 normal, no murmur, click, rub or gallop  Abdomen: soft, non-tender; bowel sounds normal; no masses,  no organomegaly  Extremities: extremities normal, atraumatic, no cyanosis or edema  Pulses: 2+ and symmetric  Skin: Skin color, texture, turgor normal. No rashes or lesions  Lymph nodes: No adenopathy. Cervical, submandibular, supraclavicular, and axillary nodes normal.  Neurologic: Grossly normal     Attestation:  DAVID Magaña DNP, performed a history and physical examination  of the patient and discussed management with the NP student. I reviewed the NP student s note and agree with the documented findings and plan of care.

## 2021-06-26 NOTE — PROGRESS NOTES
ASSESSMENT/PLAN:  Jeannine was seen today for follow-up and back pain.    Diagnoses and all orders for this visit:    Chronic bilateral low back pain with bilateral sciatica  Acute on chronic back pain.  Personally reviewed the x-ray and there were no fracture or subluxation seen  I will give her Medrol Dosepak  We will also give her Percocet.  Stop the tramadol  Continue with gabapentin and Flexeril and Cymbalta  May need to go back to the spine clinic if she has no improvement in the next 1 to 2 weeks  Follow-up sooner if she has worsening symptoms  She verbalized understanding and agree with the plan  -     Urinalysis  -     Culture, Urine  -     XR Lumbar Spine 2 or 3 VWS; Future  -     methylPREDNISolone (MEDROL DOSEPACK) 4 mg tablet; Follow package directions  -     oxyCODONE-acetaminophen (PERCOCET/ENDOCET) 5-325 mg per tablet; Take 1 tablet by mouth 2 (two) times a day as needed for pain (chronic  back pain).  -     gabapentin (NEURONTIN) 300 MG capsule; Take 3 capsules (900 mg total) by mouth 3 (three) times a day.  -     cyclobenzaprine (FLEXERIL) 10 MG tablet; Take 1 tablet (10 mg total) by mouth 3 (three) times a day as needed for muscle spasms.    Essential hypertension  Better blood pressure control.  We will refill metoprolol.  Monitor dizziness  -     metoprolol succinate (TOPROL XL) 25 MG; Take 1 tablet (25 mg total) by mouth daily.    Mood disorder (H), chronic pain  refilled  -     DULoxetine (CYMBALTA) 30 MG capsule; Take 1 capsule (30 mg total) by mouth daily.    SUBJECTIVE:    Jeannine Carvalho is a 30 y.o. female who came in today     History of chronic back pain was in her normal state of health when 4 days ago she woke up and noted sharp pain of the lower back across the low back.  She has had pain to the level of the thighs and both legs as well.  No bowel bladder dysfunction.  The day before she felt the pain she was working with her dad on his truck.  Pain at present is 10 out of 10 and it  seems to be constant.  I sent a Medrol prescription for her couple days ago which was not helpful.    Has a history of chronic back pain.  She has been seen by the spine clinic.  She has had several cortisone injection to her back.  The last one was many years ago.  MRI of the lumbar spine done in 2016 showed disc protrusion at L4-L5 with associated spinal canal stenosis.  Disc protrusion at L5-S1 with associated annular tear    Review of Systems (except those mentioned above)  Constitutional: Negative.   HENT: Negative.   Eyes: Negative.   Respiratory: Negative.   Cardiovascular: Negative.   Gastrointestinal: Negative.   Endocrine: Negative.   Genitourinary: Negative.   Musculoskeletal: Negative.   Skin: Negative.   Allergic/Immunologic: Negative.   Neurological: Negative.   Hematological: Negative.   Psychiatric/Behavioral: Negative.     Patient Active Problem List    Diagnosis Date Noted     Essential hypertension 10/01/2019     Sacroiliac joint dysfunction of both sides 04/13/2016     Vitamin D deficiency 04/13/2016     Insomnia      Headache      Herniated Lumbar Disc      No Known Allergies  Current Outpatient Medications   Medication Sig Dispense Refill     cholecalciferol, vitamin D3, 1,000 unit (25 mcg) tablet Take 1 tablet (1,000 Units total) by mouth daily. 90 tablet 0     cyclobenzaprine (FLEXERIL) 10 MG tablet Take 1 tablet (10 mg total) by mouth 3 (three) times a day as needed for muscle spasms. 270 tablet 1     DULoxetine (CYMBALTA) 30 MG capsule Take 1 capsule (30 mg total) by mouth daily. 30 capsule 0     gabapentin (NEURONTIN) 300 MG capsule Take 3 capsules (900 mg total) by mouth 3 (three) times a day. 270 capsule 3     metoprolol succinate (TOPROL XL) 25 MG Take 1 tablet (25 mg total) by mouth daily. 90 tablet 0     methylPREDNISolone (MEDROL DOSEPACK) 4 mg tablet Follow package directions 21 tablet 0     oxyCODONE-acetaminophen (PERCOCET/ENDOCET) 5-325 mg per tablet Take 1 tablet by mouth 2  (two) times a day as needed for pain (chronic  back pain). 60 tablet 0     No current facility-administered medications for this visit.      Past Medical History:   Diagnosis Date     Headache      Insomnia      LBP (low back pain)      Lumbar herniated disc      Lumbar radiculitis      Neuropathy      Obesity      Ovarian cyst      No past surgical history on file.  Social History     Socioeconomic History     Marital status: Single     Spouse name: None     Number of children: None     Years of education: None     Highest education level: None   Occupational History     None   Social Needs     Financial resource strain: None     Food insecurity     Worry: None     Inability: None     Transportation needs     Medical: None     Non-medical: None   Tobacco Use     Smoking status: Passive Smoke Exposure - Never Smoker     Smokeless tobacco: Never Used   Substance and Sexual Activity     Alcohol use: None     Drug use: None     Sexual activity: None   Lifestyle     Physical activity     Days per week: None     Minutes per session: None     Stress: None   Relationships     Social connections     Talks on phone: None     Gets together: None     Attends Taoism service: None     Active member of club or organization: None     Attends meetings of clubs or organizations: None     Relationship status: None     Intimate partner violence     Fear of current or ex partner: None     Emotionally abused: None     Physically abused: None     Forced sexual activity: None   Other Topics Concern     None   Social History Narrative    Patient presented to the ED with her mother 05/09/17     Family History   Problem Relation Age of Onset     Diabetes Mother      Hyperlipidemia Mother      Diabetes Father      Diabetes Sister      Hyperlipidemia Sister          OBJECTIVE:    Vitals:    06/15/21 0945   BP: 138/86   Patient Site: Left Arm   Patient Position: Sitting   Cuff Size: Adult Regular   Pulse: 92   SpO2: 98%   Weight: 147 lb  (66.7 kg)     Body mass index is 26.89 kg/m .    Physical Exam:  Constitutional: Patient is oriented to person, place, and time. Patient appears well-developed and well-nourished. No distress.   Head: Normocephalic and atraumatic.   Right Ear: External ear normal.   Left Ear: External ear normal.   Eyes: Conjunctivae and EOM are normal. Right eye exhibits no discharge. Left eye exhibits no discharge. No scleral icterus.   Skin: No rash noted. Patient is not diaphoretic. No erythema. No pallor.  Difficulty getting up from a sitting position.  She is unable to completely stand up straight due to pain of her back.  No tenderness to palpation of the spinous or paraspinous processes

## 2021-06-26 NOTE — TELEPHONE ENCOUNTER
Refill Approved    Rx renewed per Medication Renewal Policy. Medication was last renewed on 6/15/2021  Metoprolol and cymbalta    [facility]     Requested Prescriptions   Pending Prescriptions Disp Refills     metoprolol succinate (TOPROL-XL) 25 MG [Pharmacy Med Name: Metoprolol Succinate ER Oral Tablet Extended Release 24 Hour 25 MG] 30 tablet 0     Sig: Take 1 tablet (25 mg total) by mouth daily.       Beta-Blockers Refill Protocol Passed - 6/21/2021 11:30 AM        Passed - PCP or prescribing provider visit in past 12 months or next 3 months     Last office visit with prescriber/PCP: 6/15/2021 Magda Xiao MD OR same dept: 6/15/2021 Magda Xiao MD OR same specialty: 6/15/2021 Magda Xiao MD  Last physical: 12/11/2018 Last MTM visit: Visit date not found   Next visit within 3 mo: Visit date not found  Next physical within 3 mo: Visit date not found  Prescriber OR PCP: Magda Reeves MD  Last diagnosis associated with med order: 1. Essential hypertension  - metoprolol succinate (TOPROL-XL) 25 MG [Pharmacy Med Name: Metoprolol Succinate ER Oral Tablet Extended Release 24 Hour 25 MG]; Take 1 tablet (25 mg total) by mouth daily.  Dispense: 30 tablet; Refill: 0    2. Mood disorder (H)  - DULoxetine (CYMBALTA) 30 MG capsule [Pharmacy Med Name: DULoxetine HCl Oral Capsule Delayed Release Particles 30 MG]; Take 1 capsule (30 mg total) by mouth daily.  Dispense: 30 capsule; Refill: 0    3. Chronic pain syndrome  - DULoxetine (CYMBALTA) 30 MG capsule [Pharmacy Med Name: DULoxetine HCl Oral Capsule Delayed Release Particles 30 MG]; Take 1 capsule (30 mg total) by mouth daily.  Dispense: 30 capsule; Refill: 0    4. Vitamin D deficiency  - cholecalciferol, vitamin D3, 1,000 unit (25 mcg) tablet [Pharmacy Med Name: Vitamin D3 Oral Tablet 25 MCG]; Take 1 tablet (1,000 Units total) by mouth daily.  Dispense:  90 tablet; Refill: 0    If protocol passes may refill for 12 months if within 3 months of last provider visit (or a total of 15 months).             Passed - Blood pressure filed in past 12 months     BP Readings from Last 1 Encounters:   06/15/21 138/86                DULoxetine (CYMBALTA) 30 MG capsule [Pharmacy Med Name: DULoxetine HCl Oral Capsule Delayed Release Particles 30 MG] 30 capsule 0     Sig: Take 1 capsule (30 mg total) by mouth daily.       Tricyclics/Misc Antidepressant/Antianxiety Meds Refill Protocol Passed - 6/21/2021 11:30 AM        Passed - PCP or prescribing provider visit in last year     Last office visit with prescriber/PCP: 6/15/2021 Magda Xiao MD OR same dept: 6/15/2021 Magda Xiao MD OR same specialty: 6/15/2021 Magda Xiao MD  Last physical: 12/11/2018 Last MTM visit: Visit date not found   Next visit within 3 mo: Visit date not found  Next physical within 3 mo: Visit date not found  Prescriber OR PCP: Magda Reeves MD  Last diagnosis associated with med order: 1. Essential hypertension  - metoprolol succinate (TOPROL-XL) 25 MG [Pharmacy Med Name: Metoprolol Succinate ER Oral Tablet Extended Release 24 Hour 25 MG]; Take 1 tablet (25 mg total) by mouth daily.  Dispense: 30 tablet; Refill: 0    2. Mood disorder (H)  - DULoxetine (CYMBALTA) 30 MG capsule [Pharmacy Med Name: DULoxetine HCl Oral Capsule Delayed Release Particles 30 MG]; Take 1 capsule (30 mg total) by mouth daily.  Dispense: 30 capsule; Refill: 0    3. Chronic pain syndrome  - DULoxetine (CYMBALTA) 30 MG capsule [Pharmacy Med Name: DULoxetine HCl Oral Capsule Delayed Release Particles 30 MG]; Take 1 capsule (30 mg total) by mouth daily.  Dispense: 30 capsule; Refill: 0    4. Vitamin D deficiency  - cholecalciferol, vitamin D3, 1,000 unit (25 mcg) tablet [Pharmacy Med Name: Vitamin D3 Oral Tablet 25 MCG]; Take 1 tablet (1,000 Units total) by mouth daily.   Dispense: 90 tablet; Refill: 0    If protocol passes may refill for 12 months if within 3 months of last provider visit (or a total of 15 months).                cholecalciferol, vitamin D3, 1,000 unit (25 mcg) tablet [Pharmacy Med Name: Vitamin D3 Oral Tablet 25 MCG] 90 tablet 0     Sig: Take 1 tablet (1,000 Units total) by mouth daily       There is no refill protocol information for this order

## 2021-06-26 NOTE — TELEPHONE ENCOUNTER
RN cannot approve Refill Request    RN can NOT refill this medication med is not covered by policy/route to provider. Last office visit: 6/15/2021 Magda Xiao MD Last Physical: 12/11/2018 Last MTM visit: Visit date not found Last visit same specialty: 6/15/2021 Magda Xiao MD.  Next visit within 3 mo: Visit date not found  Next physical within 3 mo: Visit date not found      [facility] Triage/Med Refill 6/22/2021    Requested Prescriptions   Pending Prescriptions Disp Refills     metoprolol succinate (TOPROL-XL) 25 MG [Pharmacy Med Name: Metoprolol Succinate ER Oral Tablet Extended Release 24 Hour 25 MG] 30 tablet 0     Sig: Take 1 tablet (25 mg total) by mouth daily.       Beta-Blockers Refill Protocol Passed - 6/21/2021 11:30 AM        Passed - PCP or prescribing provider visit in past 12 months or next 3 months     Last office visit with prescriber/PCP: 6/15/2021 Magda Xiao MD OR same dept: 6/15/2021 Magda Xiao MD OR same specialty: 6/15/2021 Magda Xiao MD  Last physical: 12/11/2018 Last MTM visit: Visit date not found   Next visit within 3 mo: Visit date not found  Next physical within 3 mo: Visit date not found  Prescriber OR PCP: Magda Reeves MD  Last diagnosis associated with med order: 1. Essential hypertension  - metoprolol succinate (TOPROL-XL) 25 MG [Pharmacy Med Name: Metoprolol Succinate ER Oral Tablet Extended Release 24 Hour 25 MG]; Take 1 tablet (25 mg total) by mouth daily.  Dispense: 30 tablet; Refill: 0    2. Mood disorder (H)  - DULoxetine (CYMBALTA) 30 MG capsule [Pharmacy Med Name: DULoxetine HCl Oral Capsule Delayed Release Particles 30 MG]; Take 1 capsule (30 mg total) by mouth daily.  Dispense: 30 capsule; Refill: 0    3. Chronic pain syndrome  - DULoxetine (CYMBALTA) 30 MG capsule [Pharmacy Med Name: DULoxetine HCl Oral Capsule Delayed Release Particles 30  MG]; Take 1 capsule (30 mg total) by mouth daily.  Dispense: 30 capsule; Refill: 0    4. Vitamin D deficiency  - cholecalciferol, vitamin D3, 1,000 unit (25 mcg) tablet [Pharmacy Med Name: Vitamin D3 Oral Tablet 25 MCG]; Take 1 tablet (1,000 Units total) by mouth daily.  Dispense: 90 tablet; Refill: 0    If protocol passes may refill for 12 months if within 3 months of last provider visit (or a total of 15 months).             Passed - Blood pressure filed in past 12 months     BP Readings from Last 1 Encounters:   06/15/21 138/86                DULoxetine (CYMBALTA) 30 MG capsule [Pharmacy Med Name: DULoxetine HCl Oral Capsule Delayed Release Particles 30 MG] 30 capsule 0     Sig: Take 1 capsule (30 mg total) by mouth daily.       Tricyclics/Misc Antidepressant/Antianxiety Meds Refill Protocol Passed - 6/21/2021 11:30 AM        Passed - PCP or prescribing provider visit in last year     Last office visit with prescriber/PCP: 6/15/2021 Magda Xiao MD OR same dept: 6/15/2021 Magda Xiao MD OR same specialty: 6/15/2021 Magda Xiao MD  Last physical: 12/11/2018 Last MTM visit: Visit date not found   Next visit within 3 mo: Visit date not found  Next physical within 3 mo: Visit date not found  Prescriber OR PCP: Magda Reeves MD  Last diagnosis associated with med order: 1. Essential hypertension  - metoprolol succinate (TOPROL-XL) 25 MG [Pharmacy Med Name: Metoprolol Succinate ER Oral Tablet Extended Release 24 Hour 25 MG]; Take 1 tablet (25 mg total) by mouth daily.  Dispense: 30 tablet; Refill: 0    2. Mood disorder (H)  - DULoxetine (CYMBALTA) 30 MG capsule [Pharmacy Med Name: DULoxetine HCl Oral Capsule Delayed Release Particles 30 MG]; Take 1 capsule (30 mg total) by mouth daily.  Dispense: 30 capsule; Refill: 0    3. Chronic pain syndrome  - DULoxetine (CYMBALTA) 30 MG capsule [Pharmacy Med Name: DULoxetine HCl Oral Capsule Delayed Release  Particles 30 MG]; Take 1 capsule (30 mg total) by mouth daily.  Dispense: 30 capsule; Refill: 0    4. Vitamin D deficiency  - cholecalciferol, vitamin D3, 1,000 unit (25 mcg) tablet [Pharmacy Med Name: Vitamin D3 Oral Tablet 25 MCG]; Take 1 tablet (1,000 Units total) by mouth daily.  Dispense: 90 tablet; Refill: 0    If protocol passes may refill for 12 months if within 3 months of last provider visit (or a total of 15 months).                cholecalciferol, vitamin D3, 1,000 unit (25 mcg) tablet [Pharmacy Med Name: Vitamin D3 Oral Tablet 25 MCG] 90 tablet 0     Sig: Take 1 tablet (1,000 Units total) by mouth daily       There is no refill protocol information for this order

## 2021-06-27 NOTE — PROGRESS NOTES
Progress Notes by Magda Xiao MD at 12/11/2018 10:00 AM     Author: Magda Xiao MD Service: -- Author Type: Physician    Filed: 12/11/2018 12:50 PM Encounter Date: 12/11/2018 Status: Signed    : Magda Xiao MD (Physician)       FEMALE PREVENTATIVE EXAM    Assessment and Plan:     Routine general medical examination at a health care facility  We discussed healthy lifestyle, nutrition, cardiovascular risk reduction, self care, safety, sunscreen, seatbelt, and timing of cancer screening.  Health maintenance screening and immunizations reviewed with the patient.  -     Gynecologic Cytology (PAP Smear)    Herniated Lumbar Disc, chronic  Gabapentin 300mg TID  Flexeril prn and OTC analgesics prn    Vitamin D deficiency  refilled  -     cholecalciferol, vitamin D3, 1,000 unit tablet; Take 1 tablet (1,000 Units total) by mouth daily.    Insomnia  refilled  -     traZODone (DESYREL) 100 MG tablet; Take 1 tablet (100 mg total) by mouth at bedtime.    Other orders  -     Influenza, Seasonal,Quad Inj, 36+ MOS    Next follow up:  One year    Immunization Review  Adult Imm Review: No immunizations due today  Pt will recieve a flu shot today    I discussed the following with the patient:   Adult Healthy Living: Importance of regular exercise  Healthy nutrition  Getting adequate sleep  Stress management  Sunscreen    Subjective:   Chief Complaint: Jeannine Carvalho is an 28 y.o. female here for a preventative health visit.     HPI:   Health Maintenance: Patient would like to receive a flu shot today. Her LNMP was 12/1/2018. She is slightly concerned that her last period was 8 days long when it is usually 5 days. Patient is not feeling down or depressed. She is not taking Vitamin D currently because she forgot to buy a new bottle after running out. Patient does not eat meat or vegetables and does not drink milk. She denies issues with her BM's. Patient is not currently  "sexually active; she has had sex one time and does not have any vaginal symptoms or concerns about STD's.  PHQ-2 Total Score: 0 (12/11/2018 10:00 AM)    Patient is open to the idea of exercise to lower her resting heart rate. She drinks soda constantly throughout the day and night. Because she has a family history of DM she is open to the idea of adjusting to a healthier diet.     Patient takes Trazodone for sleep and is requesting a refill. Her chronic back pain is worse during the winter, especially with physical activity in the cold. 5 days ago she was carrying a lot of groceries into the house and after setting them down her thumb went numb and stayed that way for the rest of the day.       Healthy Habits  Are you taking a daily aspirin? No  Do you typically exercising at least 40 min, 3-4 times per week?  NO  Do you usually eat at least 4 servings of fruit and vegetables a day, include whole grains and fiber and avoid regularly eating high fat foods? NO  Have you had an eye exam in the past two years? NO  Do you see a dentist twice per year? NO  Do you have any concerns regarding sleep? YES    Safety Screen  If you own firearms, are they secured in a locked gun cabinet or with trigger locks? The patient does not own any firearms  No Data Recorded    Review of Systems:  Please see above.  The rest of the review of systems are negative for all systems.     PAP History:  Cancer Screening       Status Date      PAP SMEAR Overdue 11/9/2018      Done 11/9/2015 GYNECOLOGIC CYTOLOGY (PAP SMEAR)     Patient has more history with this topic...        Patient Care Team:  Magda Xiao MD as PCP - General    History     Reviewed By Date/Time Sections Reviewed    Jimmy Jerez CMA 12/11/2018 10:13 AM Tobacco        Objective:   Vital Signs:   Visit Vitals  /88 (Patient Site: Left Arm, Patient Position: Sitting, Cuff Size: Adult Regular)   Pulse 94   Ht 5' 2\" (1.575 m)   Wt 148 lb 7 oz (67.3 kg) "   LMP 12/01/2018   BMI 27.15 kg/m         PHYSICAL EXAM    Objective:    Physical Exam   Vitals:    12/11/18 1011   BP: 126/88   Pulse: 94      Constitutional: Patient is oriented to person, place, and time. Patient appears well-developed and well-nourished. No distress.   Head: Normocephalic and atraumatic.   Right Ear: External ear normal. Normal TM  Left Ear: External ear normal. Normal TM  Nose: Nose normal.   Mouth/Throat: Oropharynx is clear and moist. No oropharyngeal exudate.   Eyes: Conjunctivae and EOM are normal. Pupils are equal, round, and reactive to light. Right eye exhibits no discharge. Left eye exhibits no discharge. No scleral icterus.   Neck: Neck supple. No JVD present. No tracheal deviation present. No thyromegaly present.   Cardiovascular: Normal rate, regular rhythm, normal heart sounds and intact distal pulses. No murmur heard.   Pulmonary/Chest: Effort normal and breath sounds normal. No stridor. No respiratory distress. Patient has no wheezes, no rales, exhibits no tenderness.   Abdominal: Soft. Bowel sounds are normal. Patient exhibits no distension and no mass. There is no tenderness. There is no rebound and no guarding.   Lymphadenopathy:  Patient has no cervical adenopathy.   Neurological: Patient is alert and oriented to person, place, and time. Patient has normal reflexes. No cranial nerve deficit. Coordination normal.   Skin: Skin is warm and dry. No rash noted. Patient is not diaphoretic. No erythema. No pallor.   Normal Breast Exam  Pelvic exam: normal external genitalia, vulva, vagina, cervix, uterus and adnexa.         Medication List           Accurate as of 12/11/18 12:49 PM. If you have any questions, ask your nurse or doctor.               CHANGE how you take these medications    cyclobenzaprine 10 MG tablet  Also known as:  FLEXERIL  INSTRUCTIONS:  Take 2 tablets (20 mg total) by mouth at bedtime.  What changed:  Another medication with the same name was removed. Continue  taking this medication, and follow the directions you see here.  Changed by:  Magda Reeves MD        traZODone 100 MG tablet  Also known as:  DESYREL  INSTRUCTIONS:  Take 1 tablet (100 mg total) by mouth at bedtime.  What changed:  Another medication with the same name was removed. Continue taking this medication, and follow the directions you see here.  Changed by:  Magda Reeves MD           CONTINUE taking these medications    cholecalciferol (vitamin D3) 1,000 unit tablet  INSTRUCTIONS:  Take 1 tablet (1,000 Units total) by mouth daily.        gabapentin 300 MG capsule  Also known as:  NEURONTIN  INSTRUCTIONS:  TAKE ONE CAPSULE BY MOUTH THREE TIMES DAILY              Where to Get Your Medications      These medications were sent to [id number] - [place], [place] - [address] [facility]  [address], [place] [address]    Phone:  [phone]     cholecalciferol (vitamin D3) 1,000 unit tablet    traZODone 100 MG tablet       Additional Screenings Completed Today:     ADDITIONAL HISTORY SUMMARIZED (2): None.   DECISION TO OBTAIN EXTRA INFORMATION (1): None.   RADIOLOGY TESTS (1): None.  LABS (1): None.  MEDICINE TESTS (1): None.  INDEPENDENT REVIEW (2 each): None.     Total data points = 0    Total time was 20 minutes, greater than 50% counseling and coordinating care regarding the above issues.    By signing my name below, I, Pepper Mccullough, attest that this documentation has been prepared under the direction and in the presence of Dr. Yudy Heller.  Electronic Signature: Lizbeth Clarke. 12/11/2018 10:23.    I, Dr. Magda Reeves MD  , personally performed the services described in this documentation. All medical record entries made by the scribe were at my direction and in my presence. I have reviewed the chart and discharge instructions (if applicable) and agree that the record reflects my personal performance and is accurate and  complete.

## 2021-06-29 NOTE — PROGRESS NOTES
Progress Notes by Maggie Yang PT at 9/1/2020 11:30 AM     Author: Maggie Yang PT Service: -- Author Type: Physical Therapist    Filed: 9/1/2020  1:07 PM Encounter Date: 9/1/2020 Status: Attested    : Maggie Yang PT (Physical Therapist) Cosigner: Nikole Smith PA-C at 9/1/2020  1:36 PM    Attestation signed by Nikole Smith PA-C at 9/1/2020  1:36 PM    Continue POC.                    [facility] Certification Request    September 1, 2020      Patient: Jeannine Carvalho  MR Number: [id number]  YOB: 1990  Date of Visit: 9/1/2020      Dear Nikole Smith,    Thank you for this referral.   We are seeing Jeannine Carvalho for Physical Therapy of SIJ dysfunction.    Medicare and/or Medicaid requires physician review and approval of the treatment plan. Please review the plan of care and verify that you agree with the therapy plan of care by co-signing this note.      Plan of Care  Authorization / Certification Start Date: 09/01/20  Authorization / Certification End Date: 12/01/20  Authorization / Certification Number of Visits: 10  Communication with: Referral Source  Patient Related Instruction: Nature of Condition;Precautions;Next steps;Treatment plan and rationale;Expected outcome;Self Care instruction;Basis of treatment;Body mechanics;Posture  Times per Week: 1  Number of Weeks: 12  Number of Visits: 10  Discharge Planning: when PT goals are met  Precautions / Restrictions : none per chart  Therapeutic Exercise: ROM;Stretching;Strengthening  Neuromuscular Reeducation: core;posture  Manual Therapy: soft tissue mobilization;myofascial release;joint mobilization;muscle energy  Equipment: theraband      Goals:  Pt. will demonstrate/verbalize independence in self-management of condition in : 6 weeks  Pt. will be independent with home exercise program in : 4 weeks  Pt. will have improved quality of sleep: with less pain;waking less times/night;in 6 weeks    Pt will: be able to  lift groceries with <2/10 back pain within 8 weeks in order to improve her QOL.  Pt will: be able to perform household activities with <2/10 back pain within 8 weeks in order to improve her QOL.  Pt will: have pain-free lumbar AROM within 6 weeks in order to return to PLOF.        If you have any questions or concerns, please don't hesitate to call.    Sincerely,      Maggie Yang, PT, DPT        Physician recommendation:     ___ Follow therapist's recommendation        ___ Modify therapy      *Physician co-signature indicates they certify the need for these services furnished within this plan and while under their care.      [facility]  [facility]-Pel[facility] Initial Evaluation    Patient Name: Jeannine Carvalho  Date of evaluation: 9/1/2020  Referral Diagnosis: SIJ dysfunction  Referring provider: Nikole Smith PA-C  Visit Diagnosis: No diagnosis found.    Past Medical History:   Diagnosis Date   ? Headache    ? Insomnia    ? LBP (low back pain)    ? Lumbar herniated disc    ? Lumbar radiculitis    ? Neuropathy    ? Obesity    ? Ovarian cyst        Assessment:      Jeannine Carvalho is a 30 y.o. female who presents to therapy today with chief complaints of chronic low back pain with symptoms radiating down her legs. Symptoms began 2/2006 without known injury. Difficulty with yard/house work, washing/bathing, lifting, bending, transfers due to pain.  Pain symptoms were getting better until she lifted a  a month ago. Difficulty with assessment today due to pain increasing in back with almost all LE testing, even tests that were not testing her back. She demonstrates muscle tightness and weakness into her low back.  PT POC and goals have been discussed with patient and She  is agreeable to these. Patient appears motivated for physical therapy and is appropriate for skilled therapy services.    The POC is dynamic and will be modified on an ongoing basis.  Barriers to achieving goals as noted in the assessment  section may affect outcome.  Prognosis to achieve goals is  fair   Pt. is appropriate for skilled PT intervention as outlined in the Plan of Care (POC).  Pt. is a good candidate for skilled PT services to improve pain levels and function.    Goals:  Pt. will demonstrate/verbalize independence in self-management of condition in : 6 weeks  Pt. will be independent with home exercise program in : 4 weeks  Pt. will have improved quality of sleep: with less pain;waking less times/night;in 6 weeks    Pt will: be able to lift groceries with <2/10 back pain within 8 weeks in order to improve her QOL.  Pt will: be able to perform household activities with <2/10 back pain within 8 weeks in order to improve her QOL.  Pt will: have pain-free lumbar AROM within 6 weeks in order to return to PLOF.      Patient's expectations/goals are realistic.    Barriers to Learning or Achieving Goals:  Chronicity of the problem.       Plan / Patient Instructions:      Plan of Care:   Authorization / Certification Start Date: 09/01/20  Authorization / Certification End Date: 12/01/20  Authorization / Certification Number of Visits: 10  Communication with: Referral Source  Patient Related Instruction: Nature of Condition;Precautions;Next steps;Treatment plan and rationale;Expected outcome;Self Care instruction;Basis of treatment;Body mechanics;Posture  Times per Week: 1  Number of Weeks: 12  Number of Visits: 10  Discharge Planning: when PT goals are met  Precautions / Restrictions : none per chart  Therapeutic Exercise: ROM;Stretching;Strengthening  Neuromuscular Reeducation: core;posture  Manual Therapy: soft tissue mobilization;myofascial release;joint mobilization;muscle energy  Equipment: theraband      POC and pathology of condition were reviewed with patient.  Pt. is in agreement with the Plan of Care  A Home Exercise Program (HEP) was initiated today.  Pt. was instructed in exercises by PT and patient was given a handout with detailed  instructions.    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     History of Present Illness:    Jeannine Carvalho is a 30 y.o. female who presents to therapy today with complaints of chronic low back with symptoms radiating down both legs and stops at her knees. Date of onset/duration of symptoms is  without known injury. Was diagnosed with herniated discs a few years ago with failed treatments of PT and injections. Then was told by a surgeon that her pain was likely related to her SIJs. Pain is usually across her back and then down her legs. Pain is worse on her right. Denies numbness/tingling/weakness.  Uses heating pad once in awhile. Doesn't use ice. Lying down helps. Lifted a  a month or so ago that caused increased pain in her back so now it's worse than before.    Pain Ratin  Pain rating at best: 3  Pain rating at worst: 10  Pain description: aching in back, sharp pains of lower back. Burning in legs    Functional limitations are described as occurring with: yard/house work, washing/bathing, lifting, bending, transfers         Objective:      Note: Items left blank indicates the item was not performed or not indicated at the time of the evaluation.    Patient Outcome Measures :    Modified Oswestry Low Back Pain Disablity Questionnaire  in %: 38     Scores range from 0-100%, where a score of 0% represents minimal pain and maximal function. The minimal clinically important difference is a score reduction of 12%.    Examination  No diagnosis found.    Involved side: Bilateral  Posture Observation: protracted head and shoulders  Transfer and gait: normal    Lumbar ROM:  All [facility]  Date:    *Indicate scale AROM   Lumbar Flexion Increased pain   Lumbar Extension Increased pain    Right Left   Lumbar Sidebending     Lumbar Rotation Increased pain Increased pain   Thoracic  "Flexion    Thoracic Extension    Thoracic Sidebending     Thoracic Rotation       Lower Extremity Strength:   5/5  Date:    LE strength/5 Right Left   Hip Flexion (L1-3)     Hip Extension (L5-S1)     Hip Abduction (L4-5)     Hip Adduction (L2-3)     Hip External Rotation     Hip Internal Rotation     Knee Extension (L3-4)     Knee Flexion     Ankle Dorsiflexion (L4-5)     Great Toe Extension (L5)     Ankle Plantar flexion (S1)     Abdominals      Sensation   Intact per subjective    Palpation: tender L3-5 spinous processes and lateral of B.    Lumbar Special Tests:     Lumbar Special Tests Right Left SI Tests Right  Left   Quadrant test   SI Compression - -   Straight leg raise   SI Distraction     Crossover response   POSH Test - -   Slump - - Sacral Thrust Painful across back Painful across back   Sit-up test  YADIEL - -   Trunk extensor endurance test  Resisted Abduction     Prone instability test  Other: hip scour increase in back pain increase in back pain   Pubic shotgun  Other:stork - -       LE Screen:  Tight HS and piriformis B    Treatment Today     TREATMENT MINUTES COMMENTS   Evaluation 12 Discussed PT POC and pathology of condition.    Self-care/ Home management 18 Answered patient questions regarding management of condition. Recommend patient ice.   Manual therapy     Neuromuscular Re-education     Therapeutic Activity     Therapeutic Exercises 10 Began HEP:  Exercises:  Exercise #1: SKTC and modified piriformis stretch 30\" holds x2-3 B  Comment #1: supine HS stretch 30\" holds x2-3 B  Exercise #2: DKTC 30\" holds x2-3      Gait training     Modality__________________                Total 40    Blank areas are intentional and mean the treatment did not include these items.          PT Evaluation Code: (Please list factors)  Patient History/Comorbidities: see PMH  Examination: chronic LBP  Clinical Presentation: stable  Clinical Decision Making: low    Patient History/  Comorbidities Examination  (body " structures and functions, activity limitations, and/or participation restrictions) Clinical Presentation Clinical Decision Making (Complexity)   No documented Comorbidities or personal factors 1-2 Elements Stable and/or uncomplicated Low   1-2 documented comorbidities or personal factor 3 Elements Evolving clinical presentation with changing characteristics Moderate   3-4 documented comorbidities or personal factors 4 or more Unstable and unpredictable High              Maggie Yang, PT, DPT  9/1/2020  11:31 AM

## 2021-07-06 VITALS
DIASTOLIC BLOOD PRESSURE: 86 MMHG | SYSTOLIC BLOOD PRESSURE: 138 MMHG | OXYGEN SATURATION: 98 % | BODY MASS INDEX: 26.89 KG/M2 | HEART RATE: 92 BPM | WEIGHT: 147 LBS

## 2021-07-06 VITALS
DIASTOLIC BLOOD PRESSURE: 70 MMHG | WEIGHT: 144.3 LBS | HEART RATE: 113 BPM | BODY MASS INDEX: 26.39 KG/M2 | SYSTOLIC BLOOD PRESSURE: 90 MMHG

## 2021-07-06 ASSESSMENT — PATIENT HEALTH QUESTIONNAIRE - PHQ9: SUM OF ALL RESPONSES TO PHQ QUESTIONS 1-9: 7

## 2021-07-08 ASSESSMENT — ANXIETY QUESTIONNAIRES: GAD7 TOTAL SCORE: 2

## 2021-07-22 ENCOUNTER — MYC MEDICAL ADVICE (OUTPATIENT)
Dept: FAMILY MEDICINE | Facility: CLINIC | Age: 31
End: 2021-07-22

## 2021-07-22 DIAGNOSIS — G89.29 CHRONIC MIDLINE LOW BACK PAIN WITHOUT SCIATICA: Primary | ICD-10-CM

## 2021-07-22 DIAGNOSIS — M54.50 CHRONIC MIDLINE LOW BACK PAIN WITHOUT SCIATICA: Primary | ICD-10-CM

## 2021-07-24 RX ORDER — CYCLOBENZAPRINE HCL 10 MG
10 TABLET ORAL 3 TIMES DAILY PRN
Qty: 90 TABLET | Refills: 0 | Status: SHIPPED | OUTPATIENT
Start: 2021-07-24 | End: 2021-11-15

## 2021-07-24 RX ORDER — TRAMADOL HYDROCHLORIDE 50 MG/1
50 TABLET ORAL EVERY 6 HOURS PRN
Qty: 10 TABLET | Refills: 0 | Status: SHIPPED | OUTPATIENT
Start: 2021-07-24 | End: 2021-07-26

## 2021-08-29 ENCOUNTER — MYC MEDICAL ADVICE (OUTPATIENT)
Dept: FAMILY MEDICINE | Facility: CLINIC | Age: 31
End: 2021-08-29

## 2021-08-29 DIAGNOSIS — G89.29 CHRONIC BILATERAL LOW BACK PAIN WITHOUT SCIATICA: Primary | ICD-10-CM

## 2021-08-29 DIAGNOSIS — M54.50 CHRONIC BILATERAL LOW BACK PAIN WITHOUT SCIATICA: Primary | ICD-10-CM

## 2021-08-30 RX ORDER — OXYCODONE AND ACETAMINOPHEN 5; 325 MG/1; MG/1
1 TABLET ORAL 2 TIMES DAILY PRN
Qty: 30 TABLET | Refills: 0 | Status: SHIPPED | OUTPATIENT
Start: 2021-08-30 | End: 2021-09-29

## 2021-08-31 DIAGNOSIS — G89.29 CHRONIC BILATERAL LOW BACK PAIN, UNSPECIFIED WHETHER SCIATICA PRESENT: Primary | ICD-10-CM

## 2021-08-31 DIAGNOSIS — M54.50 CHRONIC BILATERAL LOW BACK PAIN, UNSPECIFIED WHETHER SCIATICA PRESENT: Primary | ICD-10-CM

## 2021-08-31 RX ORDER — METHYLPREDNISOLONE 4 MG
TABLET, DOSE PACK ORAL
Qty: 21 TABLET | Refills: 0 | Status: SHIPPED | OUTPATIENT
Start: 2021-08-31 | End: 2021-10-04

## 2021-09-09 ENCOUNTER — MYC MEDICAL ADVICE (OUTPATIENT)
Dept: FAMILY MEDICINE | Facility: CLINIC | Age: 31
End: 2021-09-09

## 2021-09-09 DIAGNOSIS — I10 ESSENTIAL HYPERTENSION: Primary | ICD-10-CM

## 2021-09-13 RX ORDER — LOSARTAN POTASSIUM 25 MG/1
25 TABLET ORAL DAILY
Qty: 30 TABLET | Refills: 0 | Status: SHIPPED | OUTPATIENT
Start: 2021-09-13 | End: 2021-10-11

## 2021-09-25 ENCOUNTER — HEALTH MAINTENANCE LETTER (OUTPATIENT)
Age: 31
End: 2021-09-25

## 2021-10-04 ENCOUNTER — OFFICE VISIT (OUTPATIENT)
Dept: FAMILY MEDICINE | Facility: CLINIC | Age: 31
End: 2021-10-04
Payer: COMMERCIAL

## 2021-10-04 VITALS
DIASTOLIC BLOOD PRESSURE: 90 MMHG | OXYGEN SATURATION: 99 % | HEART RATE: 92 BPM | BODY MASS INDEX: 27.8 KG/M2 | WEIGHT: 152 LBS | SYSTOLIC BLOOD PRESSURE: 120 MMHG

## 2021-10-04 DIAGNOSIS — G89.29 CHRONIC NONINTRACTABLE HEADACHE, UNSPECIFIED HEADACHE TYPE: ICD-10-CM

## 2021-10-04 DIAGNOSIS — G89.29 CHRONIC MIDLINE LOW BACK PAIN WITHOUT SCIATICA: ICD-10-CM

## 2021-10-04 DIAGNOSIS — F39 MOOD DISORDER (H): ICD-10-CM

## 2021-10-04 DIAGNOSIS — R51.9 CHRONIC NONINTRACTABLE HEADACHE, UNSPECIFIED HEADACHE TYPE: ICD-10-CM

## 2021-10-04 DIAGNOSIS — F41.9 ANXIETY: ICD-10-CM

## 2021-10-04 DIAGNOSIS — I10 PRIMARY HYPERTENSION: Primary | ICD-10-CM

## 2021-10-04 DIAGNOSIS — M54.50 CHRONIC MIDLINE LOW BACK PAIN WITHOUT SCIATICA: ICD-10-CM

## 2021-10-04 DIAGNOSIS — F51.01 PRIMARY INSOMNIA: ICD-10-CM

## 2021-10-04 DIAGNOSIS — R00.2 PALPITATIONS: ICD-10-CM

## 2021-10-04 LAB
ALBUMIN SERPL-MCNC: 3.8 G/DL (ref 3.5–5)
ALP SERPL-CCNC: 86 U/L (ref 45–120)
ALT SERPL W P-5'-P-CCNC: <9 U/L (ref 0–45)
ANION GAP SERPL CALCULATED.3IONS-SCNC: 10 MMOL/L (ref 5–18)
AST SERPL W P-5'-P-CCNC: 13 U/L (ref 0–40)
ATRIAL RATE - MUSE: 82 BPM
BILIRUB SERPL-MCNC: 0.3 MG/DL (ref 0–1)
BUN SERPL-MCNC: 6 MG/DL (ref 8–22)
CALCIUM SERPL-MCNC: 9.8 MG/DL (ref 8.5–10.5)
CHLORIDE BLD-SCNC: 99 MMOL/L (ref 98–107)
CO2 SERPL-SCNC: 30 MMOL/L (ref 22–31)
CREAT SERPL-MCNC: 0.74 MG/DL (ref 0.6–1.1)
DIASTOLIC BLOOD PRESSURE - MUSE: NORMAL MMHG
ERYTHROCYTE [DISTWIDTH] IN BLOOD BY AUTOMATED COUNT: 12.1 % (ref 10–15)
GFR SERPL CREATININE-BSD FRML MDRD: >90 ML/MIN/1.73M2
GLUCOSE BLD-MCNC: 102 MG/DL (ref 70–125)
HCT VFR BLD AUTO: 40.1 % (ref 35–47)
HGB BLD-MCNC: 13.1 G/DL (ref 11.7–15.7)
INTERPRETATION ECG - MUSE: NORMAL
MCH RBC QN AUTO: 27.7 PG (ref 26.5–33)
MCHC RBC AUTO-ENTMCNC: 32.7 G/DL (ref 31.5–36.5)
MCV RBC AUTO: 85 FL (ref 78–100)
P AXIS - MUSE: 51 DEGREES
PLATELET # BLD AUTO: 372 10E3/UL (ref 150–450)
POTASSIUM BLD-SCNC: 4.3 MMOL/L (ref 3.5–5)
PR INTERVAL - MUSE: 170 MS
PROT SERPL-MCNC: 6.9 G/DL (ref 6–8)
QRS DURATION - MUSE: 70 MS
QT - MUSE: 362 MS
QTC - MUSE: 422 MS
R AXIS - MUSE: 25 DEGREES
RBC # BLD AUTO: 4.73 10E6/UL (ref 3.8–5.2)
SODIUM SERPL-SCNC: 139 MMOL/L (ref 136–145)
SYSTOLIC BLOOD PRESSURE - MUSE: NORMAL MMHG
T AXIS - MUSE: 39 DEGREES
TSH SERPL DL<=0.005 MIU/L-ACNC: 0.91 UIU/ML (ref 0.3–5)
VENTRICULAR RATE- MUSE: 82 BPM
WBC # BLD AUTO: 9.4 10E3/UL (ref 4–11)

## 2021-10-04 PROCEDURE — 90686 IIV4 VACC NO PRSV 0.5 ML IM: CPT | Performed by: FAMILY MEDICINE

## 2021-10-04 PROCEDURE — 99214 OFFICE O/P EST MOD 30 MIN: CPT | Mod: 25 | Performed by: FAMILY MEDICINE

## 2021-10-04 PROCEDURE — 93010 ELECTROCARDIOGRAM REPORT: CPT | Performed by: GENERAL ACUTE CARE HOSPITAL

## 2021-10-04 PROCEDURE — 93005 ELECTROCARDIOGRAM TRACING: CPT | Performed by: FAMILY MEDICINE

## 2021-10-04 PROCEDURE — 90471 IMMUNIZATION ADMIN: CPT | Performed by: FAMILY MEDICINE

## 2021-10-04 PROCEDURE — 80053 COMPREHEN METABOLIC PANEL: CPT | Performed by: FAMILY MEDICINE

## 2021-10-04 PROCEDURE — 85027 COMPLETE CBC AUTOMATED: CPT | Performed by: FAMILY MEDICINE

## 2021-10-04 PROCEDURE — 84443 ASSAY THYROID STIM HORMONE: CPT | Performed by: FAMILY MEDICINE

## 2021-10-04 PROCEDURE — 36415 COLL VENOUS BLD VENIPUNCTURE: CPT | Performed by: FAMILY MEDICINE

## 2021-10-04 RX ORDER — TRAZODONE HYDROCHLORIDE 50 MG/1
50 TABLET, FILM COATED ORAL AT BEDTIME
Qty: 30 TABLET | Refills: 0 | Status: SHIPPED | OUTPATIENT
Start: 2021-10-04 | End: 2021-12-20

## 2021-10-04 RX ORDER — TRAMADOL HYDROCHLORIDE 50 MG/1
50 TABLET ORAL DAILY PRN
Qty: 30 TABLET | Refills: 0 | Status: SHIPPED | OUTPATIENT
Start: 2021-10-04 | End: 2021-11-10

## 2021-10-04 NOTE — PROGRESS NOTES
ASSESSMENT/PLAN:  Jeannine was seen today for blood pressure check and sleep problem.    Diagnoses and all orders for this visit:    Primary hypertension  Continue with losartan 25 mg  Back in 1 month for a follow-up    Palpitations  EKG was normal sinus rhythm  Check labs  -     EKG 12-lead, tracing only  -     CBC with platelets; Future  -     Comprehensive metabolic panel (BMP + Alb, Alk Phos, ALT, AST, Total. Bili, TP); Future  -     TSH with free T4 reflex; Future  -     CBC with platelets  -     Comprehensive metabolic panel (BMP + Alb, Alk Phos, ALT, AST, Total. Bili, TP)  -     TSH with free T4 reflex    Primary insomnia  Start trazodone  Discussed turning off electronics at 9 PM and work on sleep hygiene  -     traZODone (DESYREL) 50 MG tablet; Take 1 tablet (50 mg) by mouth At Bedtime    Mood disorder (H)  Continue with Cymbalta 30 mg  Add trazodone  -     traZODone (DESYREL) 50 MG tablet; Take 1 tablet (50 mg) by mouth At Bedtime    Anxiety  Continue with gabapentin 300 mg 3 times a day    Chronic nonintractable headache, unspecified headache type  Continue gabapentin 300 mg 3 times a day  Continue with Flexeril as needed  Refill tramadol as needed    Chronic midline low back pain without sciatica  -     traMADol (ULTRAM) 50 MG tablet; Take 1 tablet (50 mg) by mouth daily as needed for severe pain (chronic back pain)  Continue gabapentin 300 mg 3 times a day  Continue with Flexeril as needed  Refill tramadol as needed    Other orders  -     IL FLU VAC PRESRV FREE QUAD SPLIT VIR IM  MONTHS IM    SUBJECTIVE:    Jeannine Carvalho is a 31 year old female who came in today     Elevated BP at home  Today in the clinic = 120/86  Metoprolol was switched to losartan because of dizziness, which has improved  No chest pain. No SOB  At times she feels flutter in her chest    Not sleeping well  Having a hard time falling asleep  Worse over the last 3 wks  Feels depressed at time. On duloxetine 30mg. Takes gabapentin  300mg TID for back pain & HA.  Has been noticing more frequent headachas lately    Review of Systems (except those mentioned above)  Constitutional: Negative.   HENT: Negative.   Eyes: Negative.   Respiratory: Negative.   Cardiovascular: Negative.   Gastrointestinal: Negative.   Endocrine: Negative.   Genitourinary: Negative.   Musculoskeletal: Negative.   Skin: Negative.   Allergic/Immunologic: Negative.   Neurological: Negative.   Hematological: Negative.   Psychiatric/Behavioral: Negative.     Patient Active Problem List    Diagnosis Date Noted     Back pain 07/01/2008     Priority: Medium     Viral warts 04/24/2008     Priority: Medium     Problem list name updated by automated process. Provider to review       No Known Allergies  Current Outpatient Medications   Medication Sig Dispense Refill     cyclobenzaprine (FLEXERIL) 10 MG tablet Take 1 tablet (10 mg) by mouth 3 times daily as needed for muscle spasms 90 tablet 0     losartan (COZAAR) 25 MG tablet Take 1 tablet (25 mg) by mouth daily 30 tablet 0     traMADol (ULTRAM) 50 MG tablet Take 1 tablet (50 mg) by mouth daily as needed for severe pain (chronic back pain) 30 tablet 0     traZODone (DESYREL) 50 MG tablet Take 1 tablet (50 mg) by mouth At Bedtime 30 tablet 0     Past Medical History:   Diagnosis Date     Headache      Insomnia      LBP (low back pain)      Lumbar herniated disc      Lumbar radiculitis      Neuropathy      Obesity      Ovarian cyst      No past surgical history on file.  Social History     Socioeconomic History     Marital status: Single     Spouse name: None     Number of children: None     Years of education: None     Highest education level: None   Occupational History     None   Tobacco Use     Smoking status: Passive Smoke Exposure - Never Smoker     Smokeless tobacco: Never Used   Substance and Sexual Activity     Alcohol use: None     Drug use: None     Sexual activity: None   Other Topics Concern     None   Social History  Narrative    Patient presented to the ED with her mother 05/09/17       Social Determinants of Health     Financial Resource Strain:      Difficulty of Paying Living Expenses:    Food Insecurity:      Worried About Running Out of Food in the Last Year:      Ran Out of Food in the Last Year:    Transportation Needs:      Lack of Transportation (Medical):      Lack of Transportation (Non-Medical):    Physical Activity:      Days of Exercise per Week:      Minutes of Exercise per Session:    Stress:      Feeling of Stress :    Social Connections:      Frequency of Communication with Friends and Family:      Frequency of Social Gatherings with Friends and Family:      Attends Jainism Services:      Active Member of Clubs or Organizations:      Attends Club or Organization Meetings:      Marital Status:    Intimate Partner Violence:      Fear of Current or Ex-Partner:      Emotionally Abused:      Physically Abused:      Sexually Abused:      Family History   Problem Relation Age of Onset     Diabetes Mother      Hyperlipidemia Mother      Diabetes Father      Diabetes Sister      Hyperlipidemia Sister          OBJECTIVE:    Vitals:    10/04/21 1115 10/04/21 1149   BP: 120/86 (!) 120/90   BP Location: Left arm    Patient Position: Sitting    Cuff Size: Adult Regular    Pulse: 92    SpO2: 99%    Weight: 68.9 kg (152 lb)      Body mass index is 27.8 kg/m .    Physical Exam:  Constitutional: Patient was oriented to person, place, and time. Patient appeared well-developed and well-nourished. No distress.   Head: Normocephalic and atraumatic.   Right Ear: External ear normal.   Left Ear: External ear normal.  Eyes: Conjunctivae and EOM were normal. Right eye exhibited no discharge. Left eye exhibited no discharge. No scleral icterus.   Neck: Neck supple. No JVD present. No tracheal deviation present. No thyromegaly present.   Lymphadenopathy:  Patient has no cervical adenopathy.   Cardiovascular: Normal rate, regular  rhythm, normal heart sounds and intact distal pulses. No murmur heard.   Pulmonary/Chest: Effort normal and breath sounds normal. No stridor. No respiratory distress. Patient had no wheezes, no rales, exhibits no tenderness.

## 2021-10-10 ENCOUNTER — MYC MEDICAL ADVICE (OUTPATIENT)
Dept: FAMILY MEDICINE | Facility: CLINIC | Age: 31
End: 2021-10-10

## 2021-10-10 DIAGNOSIS — I10 ESSENTIAL HYPERTENSION: ICD-10-CM

## 2021-10-10 NOTE — TELEPHONE ENCOUNTER
Chief Complaint   Patient presents with    Well Child    Other     black bump on leg     Visit Vitals    Temp 98.3 °F (36.8 °C) (Tympanic)    Ht (!) 2' 8\" (0.813 m)    Wt 24 lb 9 oz (11.1 kg)    HC 46.9 cm    BMI 16.86 kg/m2 Please advise

## 2021-10-11 RX ORDER — LOSARTAN POTASSIUM 25 MG/1
25 TABLET ORAL DAILY
Qty: 30 TABLET | Refills: 0 | Status: SHIPPED | OUTPATIENT
Start: 2021-10-11 | End: 2021-11-10

## 2021-11-10 ENCOUNTER — MYC REFILL (OUTPATIENT)
Dept: FAMILY MEDICINE | Facility: CLINIC | Age: 31
End: 2021-11-10
Payer: COMMERCIAL

## 2021-11-10 ENCOUNTER — MYC MEDICAL ADVICE (OUTPATIENT)
Dept: FAMILY MEDICINE | Facility: CLINIC | Age: 31
End: 2021-11-10
Payer: COMMERCIAL

## 2021-11-10 DIAGNOSIS — I10 ESSENTIAL HYPERTENSION: ICD-10-CM

## 2021-11-10 DIAGNOSIS — G89.29 CHRONIC MIDLINE LOW BACK PAIN WITHOUT SCIATICA: ICD-10-CM

## 2021-11-10 DIAGNOSIS — M54.50 CHRONIC MIDLINE LOW BACK PAIN WITHOUT SCIATICA: ICD-10-CM

## 2021-11-10 RX ORDER — LOSARTAN POTASSIUM 25 MG/1
25 TABLET ORAL DAILY
Qty: 90 TABLET | Refills: 3 | Status: SHIPPED | OUTPATIENT
Start: 2021-11-10 | End: 2021-12-20

## 2021-11-10 RX ORDER — LOSARTAN POTASSIUM 25 MG/1
25 TABLET ORAL DAILY
Qty: 30 TABLET | Refills: 0 | Status: CANCELLED | OUTPATIENT
Start: 2021-11-10

## 2021-11-11 RX ORDER — TRAMADOL HYDROCHLORIDE 50 MG/1
50 TABLET ORAL DAILY PRN
Qty: 30 TABLET | Refills: 0 | Status: SHIPPED | OUTPATIENT
Start: 2021-11-11 | End: 2021-12-09

## 2021-11-11 NOTE — TELEPHONE ENCOUNTER
Routing refill request to provider for review/approval because:  Controlled substance request.    Last Written Prescription Date:  10/4/21  Last Fill Quantity: 30,  # refills: 0   Last office visit provider:  10/4/21     Requested Prescriptions   Pending Prescriptions Disp Refills     traMADol (ULTRAM) 50 MG tablet 30 tablet 0     Sig: Take 1 tablet (50 mg) by mouth daily as needed for severe pain (chronic back pain)       There is no refill protocol information for this order          Rosalinda Arroyo RN 11/11/21 12:26 PM

## 2021-11-11 NOTE — TELEPHONE ENCOUNTER
Last discussed at 10/4/21 visit with Pina and for on 10/4/21 #30. SHARON CASTILLO on 11/11/2021 at 12:47 PM

## 2021-11-15 ENCOUNTER — MYC MEDICAL ADVICE (OUTPATIENT)
Dept: FAMILY MEDICINE | Facility: CLINIC | Age: 31
End: 2021-11-15
Payer: COMMERCIAL

## 2021-11-15 DIAGNOSIS — G89.29 CHRONIC MIDLINE LOW BACK PAIN WITHOUT SCIATICA: ICD-10-CM

## 2021-11-15 DIAGNOSIS — M54.50 CHRONIC MIDLINE LOW BACK PAIN WITHOUT SCIATICA: ICD-10-CM

## 2021-11-15 RX ORDER — CYCLOBENZAPRINE HCL 10 MG
10 TABLET ORAL 3 TIMES DAILY PRN
Qty: 90 TABLET | Refills: 0 | Status: SHIPPED | OUTPATIENT
Start: 2021-11-15 | End: 2021-12-23

## 2021-11-23 ENCOUNTER — MYC MEDICAL ADVICE (OUTPATIENT)
Dept: FAMILY MEDICINE | Facility: CLINIC | Age: 31
End: 2021-11-23
Payer: COMMERCIAL

## 2021-11-23 DIAGNOSIS — M54.50 CHRONIC MIDLINE LOW BACK PAIN, UNSPECIFIED WHETHER SCIATICA PRESENT: Primary | ICD-10-CM

## 2021-11-23 DIAGNOSIS — G89.29 CHRONIC MIDLINE LOW BACK PAIN, UNSPECIFIED WHETHER SCIATICA PRESENT: Primary | ICD-10-CM

## 2021-11-24 RX ORDER — OXYCODONE AND ACETAMINOPHEN 5; 325 MG/1; MG/1
1 TABLET ORAL DAILY PRN
Qty: 30 TABLET | Refills: 0 | Status: SHIPPED | OUTPATIENT
Start: 2021-11-24 | End: 2021-12-23

## 2021-11-24 RX ORDER — LISINOPRIL 10 MG/1
10 TABLET ORAL DAILY
COMMUNITY
Start: 2021-02-10 | End: 2021-12-20

## 2021-11-24 RX ORDER — DULOXETIN HYDROCHLORIDE 30 MG/1
30 CAPSULE, DELAYED RELEASE ORAL DAILY
COMMUNITY
Start: 2021-09-20 | End: 2021-12-20

## 2021-11-24 RX ORDER — OXYCODONE AND ACETAMINOPHEN 5; 325 MG/1; MG/1
1 TABLET ORAL
COMMUNITY
Start: 2021-06-15 | End: 2021-11-24

## 2021-11-24 RX ORDER — GABAPENTIN 300 MG/1
CAPSULE ORAL
COMMUNITY
Start: 2021-09-28 | End: 2021-12-28

## 2021-11-24 RX ORDER — CHOLECALCIFEROL (VITAMIN D3) 25 MCG
1 TABLET ORAL DAILY
COMMUNITY
Start: 2021-08-11 | End: 2022-10-19

## 2021-11-24 RX ORDER — METOPROLOL SUCCINATE 25 MG/1
25 TABLET, EXTENDED RELEASE ORAL
COMMUNITY
Start: 2021-06-22 | End: 2021-12-20

## 2021-11-24 NOTE — TELEPHONE ENCOUNTER
Refill request for medication: percocet   Last visit addressing this medication: 10/4/21  Follow up plan 1  months  Last refill on 6/15/21, quantity #60   CSA completed not on file  Date PDMP was last reviewed never reviewed     Appointment: Appointment scheduled for 12/20/21   Appointment recommended at least every 3 months for opioid prescriptions. Appointment recommended at least every 6 months for ADHD medications.

## 2021-12-08 ENCOUNTER — MYC MEDICAL ADVICE (OUTPATIENT)
Dept: FAMILY MEDICINE | Facility: CLINIC | Age: 31
End: 2021-12-08
Payer: COMMERCIAL

## 2021-12-08 DIAGNOSIS — G89.29 CHRONIC MIDLINE LOW BACK PAIN WITHOUT SCIATICA: ICD-10-CM

## 2021-12-08 DIAGNOSIS — M54.50 CHRONIC MIDLINE LOW BACK PAIN WITHOUT SCIATICA: ICD-10-CM

## 2021-12-09 RX ORDER — TRAMADOL HYDROCHLORIDE 50 MG/1
50 TABLET ORAL DAILY PRN
Qty: 30 TABLET | Refills: 0 | Status: SHIPPED | OUTPATIENT
Start: 2021-12-09 | End: 2022-01-11

## 2021-12-20 ENCOUNTER — OFFICE VISIT (OUTPATIENT)
Dept: FAMILY MEDICINE | Facility: CLINIC | Age: 31
End: 2021-12-20
Payer: COMMERCIAL

## 2021-12-20 VITALS
HEART RATE: 88 BPM | SYSTOLIC BLOOD PRESSURE: 120 MMHG | HEIGHT: 62 IN | WEIGHT: 153.44 LBS | OXYGEN SATURATION: 100 % | DIASTOLIC BLOOD PRESSURE: 108 MMHG | BODY MASS INDEX: 28.24 KG/M2

## 2021-12-20 DIAGNOSIS — M54.50 CHRONIC BILATERAL LOW BACK PAIN WITHOUT SCIATICA: ICD-10-CM

## 2021-12-20 DIAGNOSIS — I10 PRIMARY HYPERTENSION: ICD-10-CM

## 2021-12-20 DIAGNOSIS — Z23 HIGH PRIORITY FOR 2019-NCOV VACCINE: ICD-10-CM

## 2021-12-20 DIAGNOSIS — F51.01 PRIMARY INSOMNIA: ICD-10-CM

## 2021-12-20 DIAGNOSIS — Z00.00 ROUTINE ADULT HEALTH MAINTENANCE: Primary | ICD-10-CM

## 2021-12-20 DIAGNOSIS — I10 ESSENTIAL HYPERTENSION: ICD-10-CM

## 2021-12-20 DIAGNOSIS — G89.29 CHRONIC BILATERAL LOW BACK PAIN WITHOUT SCIATICA: ICD-10-CM

## 2021-12-20 DIAGNOSIS — F39 MOOD DISORDER (H): ICD-10-CM

## 2021-12-20 PROCEDURE — 91300 COVID-19,PF,PFIZER (12+ YRS): CPT | Performed by: FAMILY MEDICINE

## 2021-12-20 PROCEDURE — 99214 OFFICE O/P EST MOD 30 MIN: CPT | Mod: 25 | Performed by: FAMILY MEDICINE

## 2021-12-20 PROCEDURE — 87624 HPV HI-RISK TYP POOLED RSLT: CPT | Performed by: FAMILY MEDICINE

## 2021-12-20 PROCEDURE — G0123 SCREEN CERV/VAG THIN LAYER: HCPCS | Performed by: FAMILY MEDICINE

## 2021-12-20 PROCEDURE — 0004A COVID-19,PF,PFIZER (12+ YRS): CPT | Performed by: FAMILY MEDICINE

## 2021-12-20 PROCEDURE — 99395 PREV VISIT EST AGE 18-39: CPT | Mod: 25 | Performed by: FAMILY MEDICINE

## 2021-12-20 PROCEDURE — 96127 BRIEF EMOTIONAL/BEHAV ASSMT: CPT | Performed by: FAMILY MEDICINE

## 2021-12-20 RX ORDER — LOSARTAN POTASSIUM 50 MG/1
50 TABLET ORAL DAILY
Qty: 90 TABLET | Refills: 1 | Status: SHIPPED | OUTPATIENT
Start: 2021-12-20 | End: 2022-04-27

## 2021-12-20 RX ORDER — DULOXETIN HYDROCHLORIDE 60 MG/1
60 CAPSULE, DELAYED RELEASE ORAL DAILY
Qty: 90 CAPSULE | Refills: 0 | Status: SHIPPED | OUTPATIENT
Start: 2021-12-20 | End: 2022-03-16

## 2021-12-20 RX ORDER — TRAZODONE HYDROCHLORIDE 100 MG/1
100 TABLET ORAL AT BEDTIME
Qty: 90 TABLET | Refills: 0 | Status: SHIPPED | OUTPATIENT
Start: 2021-12-20 | End: 2022-05-23

## 2021-12-20 ASSESSMENT — MIFFLIN-ST. JEOR: SCORE: 1364.24

## 2021-12-20 NOTE — PROGRESS NOTES
SUBJECTIVE:   CC: Jeannine Carvalho is an 31 year old woman who presents for preventive health visit.       Patient has been advised of split billing requirements and indicates understanding: Yes  Healthy Habits:     Getting at least 3 servings of Calcium per day:  NO    Bi-annual eye exam:  NO    Dental care twice a year:  NO    Sleep apnea or symptoms of sleep apnea:  Daytime drowsiness    Diet:  Regular (no restrictions)    Frequency of exercise:  None    Taking medications regularly:  Yes    Medication side effects:  None    PHQ-2 Total Score: 2    Additional concerns today:  No    Ability to successfully perform activities of daily living: Yes, no assistance needed  Home safety:  none identified     Today's PHQ-2 Score:   PHQ-2 ( 1999 [employer]) 12/20/2021   Q1: Little interest or pleasure in doing things 1   Q2: Feeling down, depressed or hopeless 1   PHQ-2 Score 2   Q1: Little interest or pleasure in doing things Several days   Q2: Feeling down, depressed or hopeless Several days   PHQ-2 Score 2       Abuse: Current or Past (Physical, Sexual or Emotional) - No  Do you feel safe in your environment? Yes    Social History     Tobacco Use     Smoking status: Passive Smoke Exposure - Never Smoker     Smokeless tobacco: Never Used   Substance Use Topics     Alcohol use: Not on file     If you drink alcohol do you typically have >3 drinks per day or >7 drinks per week? No    Alcohol Use 12/20/2021   Prescreen: >3 drinks/day or >7 drinks/week? No   No flowsheet data found.    Reviewed orders with patient.  Reviewed health maintenance and updated orders accordingly - Yes  Lab work is in process    Breast Cancer Screening:    Breast CA Risk Assessment (FHS-7) 12/20/2021   Do you have a family history of breast, colon, or ovarian cancer? No / Unknown       click delete button to remove this line now  Patient under 40 years of age: Routine Mammogram Screening not recommended.   Pertinent mammograms are reviewed under the  "imaging tab.    History of abnormal Pap smear: NO - age 30- 65 PAP every 3 years recommended  PAP / HPV Latest Ref Rng & Units 12/11/2018 11/9/2015   PAP Negative for squamous intraepithelial lesion or malignancy. Negative for squamous intraepithelial lesion or malignancy  Electronically signed by Narciso Choi CT (ASCP) on 12/17/2018 at  2:34 PM   Negative for squamous intraepithelial lesion or malignancy  Electronically signed by Larissa Jackson CT (ASCP) on 11/19/2015 at 11:09 AM       Reviewed and updated as needed this visit by clinical staff  Tobacco  Allergies  Meds  Problems            Reviewed and updated as needed this visit by Provider    Meds  Problems           Past Medical History:   Diagnosis Date     Headache      Insomnia      LBP (low back pain)      Lumbar herniated disc      Lumbar radiculitis      Neuropathy      Obesity      Ovarian cyst       No past surgical history on file.    Review of Systems  CONSTITUTIONAL: NEGATIVE for fever, chills, change in weight  INTEGUMENTARU/SKIN: NEGATIVE for worrisome rashes, moles or lesions  EYES: NEGATIVE for vision changes or irritation  ENT: NEGATIVE for ear, mouth and throat problems  RESP: NEGATIVE for significant cough or SOB  BREAST: NEGATIVE for masses, tenderness or discharge  CV: NEGATIVE for chest pain, palpitations or peripheral edema  GI: NEGATIVE for nausea, abdominal pain, heartburn, or change in bowel habits  : NEGATIVE for unusual urinary or vaginal symptoms. Periods are regular.  MUSCULOSKELETAL: NEGATIVE for significant arthralgias or myalgia  NEURO: NEGATIVE for weakness, dizziness or paresthesias  PSYCHIATRIC: NEGATIVE for changes in mood or affect     OBJECTIVE:   BP (!) 120/108   Pulse 88   Ht 1.575 m (5' 2\")   Wt 69.6 kg (153 lb 7 oz)   LMP 11/21/2021   SpO2 100%   BMI 28.06 kg/m    Physical Exam  GENERAL: healthy, alert and no distress  EYES: Eyes grossly normal to inspection, PERRL and conjunctivae and sclerae " normal  HENT: ear canals and TM's normal, nose and mouth without ulcers or lesions  NECK: no adenopathy, no asymmetry, masses, or scars and thyroid normal to palpation  RESP: lungs clear to auscultation - no rales, rhonchi or wheezes  BREAST: normal without masses, tenderness or nipple discharge and no palpable axillary masses or adenopathy  CV: regular rate and rhythm, normal S1 S2, no S3 or S4, no murmur, click or rub, no peripheral edema and peripheral pulses strong  ABDOMEN: soft, nontender, no hepatosplenomegaly, no masses and bowel sounds normal  MS: no gross musculoskeletal defects noted, no edema  SKIN: no suspicious lesions or rashes  NEURO: Normal strength and tone, mentation intact and speech normal  PSYCH: mentation appears normal, affect normal/bright  Pelvic exam: normal vagina and vulva, normal cervix without lesions or tenderness, uterus normal size anteverted, adenxa normal in size without tenderness, pap smear done    Diagnostic Test Results:  Labs reviewed in Epic    ASSESSMENT/PLAN:   Jeannine was seen today for physical, imm/inj and imm/inj.    Diagnoses and all orders for this visit:    Routine adult health maintenance  -     Pap screen with HPV - recommended age 30 - 65 years  We discussed healthy lifestyle, nutrition, cardiovascular risk reduction, self care, safety, sunscreen, and timing of cancer screening.  Health maintenance screening and immunizations reviewed with the patient.  Follow up yearly for the annual physical.     Primary hypertension  Her blood pressure is elevated today. increase losartan to 50 mg.  Follow-up in 3 months for blood pressure recheck    Primary insomnia  -     traZODone (DESYREL) 100 MG tablet; Take 1 tablet (100 mg) by mouth At Bedtime  Increase the dose to 100 mg  Follow-up in 3 months    Mood disorder (H)  -     traZODone (DESYREL) 100 MG tablet; Take 1 tablet (100 mg) by mouth At Bedtime  -     DULoxetine (CYMBALTA) 60 MG capsule; Take 1 capsule (60 mg) by  "mouth daily  Increase trazodone to 100 mg and duloxetine to 60 mg  Follow-up in 3 months    Chronic bilateral low back pain without sciatica  She follows with orthopedics  She takes tramadol, gabapentin, Flexeril, Cymbalta    High priority for 2019-nCoV vaccine  -     COVID-19,PF,PFIZER (12+ Yrs PURPLE LABEL)    Patient has been advised of split billing requirements and indicates understanding: Yes  COUNSELING:  Reviewed preventive health counseling, as reflected in patient instructions    Estimated body mass index is 28.06 kg/m  as calculated from the following:    Height as of this encounter: 1.575 m (5' 2\").    Weight as of this encounter: 69.6 kg (153 lb 7 oz).    Weight management plan: Discussed healthy diet and exercise guidelines    She reports that she is a non-smoker but has been exposed to tobacco smoke. She has never used smokeless tobacco.      Counseling Resources:  ATP IV Guidelines  Pooled Cohorts Equation Calculator  Breast Cancer Risk Calculator  BRCA-Related Cancer Risk Assessment: FHS-7 Tool  FRAX Risk Assessment  ICSI Preventive Guidelines  Dietary Guidelines for Americans, 2010  USDA's MyPlate  ASA Prophylaxis  Lung CA Screening    Magda Reeves MD  [facility]  "

## 2021-12-22 LAB
HUMAN PAPILLOMA VIRUS 16 DNA: NEGATIVE
HUMAN PAPILLOMA VIRUS 18 DNA: NEGATIVE
HUMAN PAPILLOMA VIRUS FINAL DIAGNOSIS: NORMAL
HUMAN PAPILLOMA VIRUS OTHER HR: NEGATIVE

## 2021-12-23 ENCOUNTER — MYC MEDICAL ADVICE (OUTPATIENT)
Dept: FAMILY MEDICINE | Facility: CLINIC | Age: 31
End: 2021-12-23
Payer: COMMERCIAL

## 2021-12-23 DIAGNOSIS — G89.29 CHRONIC MIDLINE LOW BACK PAIN, UNSPECIFIED WHETHER SCIATICA PRESENT: ICD-10-CM

## 2021-12-23 DIAGNOSIS — M54.50 CHRONIC MIDLINE LOW BACK PAIN, UNSPECIFIED WHETHER SCIATICA PRESENT: ICD-10-CM

## 2021-12-23 DIAGNOSIS — M54.50 CHRONIC MIDLINE LOW BACK PAIN WITHOUT SCIATICA: ICD-10-CM

## 2021-12-23 DIAGNOSIS — G89.29 CHRONIC MIDLINE LOW BACK PAIN WITHOUT SCIATICA: ICD-10-CM

## 2021-12-23 LAB
BKR LAB AP GYN ADEQUACY: NORMAL
BKR LAB AP GYN INTERPRETATION: NORMAL
BKR LAB AP HPV REFLEX: NORMAL
BKR LAB AP LMP: NORMAL
BKR LAB AP PREVIOUS ABNORMAL: NORMAL
PATH REPORT.COMMENTS IMP SPEC: NORMAL
PATH REPORT.COMMENTS IMP SPEC: NORMAL
PATH REPORT.RELEVANT HX SPEC: NORMAL

## 2021-12-23 RX ORDER — OXYCODONE AND ACETAMINOPHEN 5; 325 MG/1; MG/1
1 TABLET ORAL DAILY PRN
Qty: 30 TABLET | Refills: 0 | Status: SHIPPED | OUTPATIENT
Start: 2021-12-23 | End: 2022-01-17

## 2021-12-23 RX ORDER — CYCLOBENZAPRINE HCL 10 MG
10 TABLET ORAL 3 TIMES DAILY PRN
Qty: 90 TABLET | Refills: 0 | Status: SHIPPED | OUTPATIENT
Start: 2021-12-23 | End: 2022-01-11

## 2021-12-23 NOTE — TELEPHONE ENCOUNTER
Refill request for medication: percocet and cyclobenzaprine   Last visit addressing this medication: 12/20/21  Follow up plan 3  months  Last refill on oxy 11/24/21 qty 30, cyclobenzaprine 11/15/21 qty 90  CSA completed not on file   Date PDMP was last reviewed never reviewed     Appointment: Not due   Appointment recommended at least every 3 months for opioid prescriptions. Appointment recommended at least every 6 months for ADHD medications.

## 2021-12-28 ENCOUNTER — TRANSFERRED RECORDS (OUTPATIENT)
Dept: HEALTH INFORMATION MANAGEMENT | Facility: CLINIC | Age: 31
End: 2021-12-28
Payer: COMMERCIAL

## 2022-01-11 ENCOUNTER — MYC MEDICAL ADVICE (OUTPATIENT)
Dept: FAMILY MEDICINE | Facility: CLINIC | Age: 32
End: 2022-01-11
Payer: COMMERCIAL

## 2022-01-11 DIAGNOSIS — G89.29 CHRONIC MIDLINE LOW BACK PAIN, UNSPECIFIED WHETHER SCIATICA PRESENT: ICD-10-CM

## 2022-01-11 DIAGNOSIS — G89.29 CHRONIC MIDLINE LOW BACK PAIN WITHOUT SCIATICA: ICD-10-CM

## 2022-01-11 DIAGNOSIS — M54.50 CHRONIC MIDLINE LOW BACK PAIN, UNSPECIFIED WHETHER SCIATICA PRESENT: ICD-10-CM

## 2022-01-11 DIAGNOSIS — M54.50 CHRONIC MIDLINE LOW BACK PAIN WITHOUT SCIATICA: ICD-10-CM

## 2022-01-11 RX ORDER — CYCLOBENZAPRINE HCL 10 MG
10 TABLET ORAL 3 TIMES DAILY PRN
Qty: 90 TABLET | Refills: 0 | Status: SHIPPED | OUTPATIENT
Start: 2022-01-11 | End: 2022-02-14

## 2022-01-11 RX ORDER — TRAMADOL HYDROCHLORIDE 50 MG/1
50 TABLET ORAL DAILY PRN
Qty: 30 TABLET | Refills: 0 | Status: SHIPPED | OUTPATIENT
Start: 2022-01-11 | End: 2022-01-24

## 2022-01-16 ENCOUNTER — MYC MEDICAL ADVICE (OUTPATIENT)
Dept: FAMILY MEDICINE | Facility: CLINIC | Age: 32
End: 2022-01-16
Payer: COMMERCIAL

## 2022-01-16 DIAGNOSIS — G89.29 CHRONIC MIDLINE LOW BACK PAIN, UNSPECIFIED WHETHER SCIATICA PRESENT: ICD-10-CM

## 2022-01-16 DIAGNOSIS — M54.50 CHRONIC MIDLINE LOW BACK PAIN, UNSPECIFIED WHETHER SCIATICA PRESENT: ICD-10-CM

## 2022-01-17 RX ORDER — OXYCODONE AND ACETAMINOPHEN 5; 325 MG/1; MG/1
1 TABLET ORAL DAILY PRN
Qty: 30 TABLET | Refills: 0 | Status: SHIPPED | OUTPATIENT
Start: 2022-01-17 | End: 2022-01-24

## 2022-01-17 NOTE — TELEPHONE ENCOUNTER
Has follow up appointment on 1/24/22    Last filled 12/23/21 qty 30 with sig 1 tab PO PRN for severe chronic back pain.     Please advise refill request

## 2022-01-18 ENCOUNTER — TRANSFERRED RECORDS (OUTPATIENT)
Dept: HEALTH INFORMATION MANAGEMENT | Facility: CLINIC | Age: 32
End: 2022-01-18
Payer: COMMERCIAL

## 2022-01-24 ENCOUNTER — OFFICE VISIT (OUTPATIENT)
Dept: FAMILY MEDICINE | Facility: CLINIC | Age: 32
End: 2022-01-24
Payer: COMMERCIAL

## 2022-01-24 ENCOUNTER — TRANSFERRED RECORDS (OUTPATIENT)
Dept: HEALTH INFORMATION MANAGEMENT | Facility: CLINIC | Age: 32
End: 2022-01-24

## 2022-01-24 VITALS
SYSTOLIC BLOOD PRESSURE: 128 MMHG | WEIGHT: 159.3 LBS | BODY MASS INDEX: 29.14 KG/M2 | DIASTOLIC BLOOD PRESSURE: 90 MMHG | HEART RATE: 82 BPM | TEMPERATURE: 98.1 F | OXYGEN SATURATION: 97 %

## 2022-01-24 DIAGNOSIS — M54.50 CHRONIC BILATERAL LOW BACK PAIN WITHOUT SCIATICA: ICD-10-CM

## 2022-01-24 DIAGNOSIS — M54.50 CHRONIC MIDLINE LOW BACK PAIN, UNSPECIFIED WHETHER SCIATICA PRESENT: ICD-10-CM

## 2022-01-24 DIAGNOSIS — G89.29 CHRONIC BILATERAL LOW BACK PAIN WITHOUT SCIATICA: ICD-10-CM

## 2022-01-24 DIAGNOSIS — G89.29 CHRONIC MIDLINE LOW BACK PAIN, UNSPECIFIED WHETHER SCIATICA PRESENT: ICD-10-CM

## 2022-01-24 DIAGNOSIS — N83.202 CYST OF LEFT OVARY: Primary | ICD-10-CM

## 2022-01-24 DIAGNOSIS — I10 PRIMARY HYPERTENSION: ICD-10-CM

## 2022-01-24 PROCEDURE — 99214 OFFICE O/P EST MOD 30 MIN: CPT | Performed by: FAMILY MEDICINE

## 2022-01-24 RX ORDER — OXYCODONE AND ACETAMINOPHEN 5; 325 MG/1; MG/1
1 TABLET ORAL 2 TIMES DAILY PRN
Qty: 60 TABLET | Refills: 0
Start: 2022-01-24 | End: 2022-01-27

## 2022-01-24 NOTE — PROGRESS NOTES
"ASSESSMENT/PLAN:  Jeannine was seen today for other.    Diagnoses and all orders for this visit:    Cyst of left ovary  -     US Pelvic Complete with Transvaginal; Future    Chronic bilateral low back pain without sciatica  -     Miscellaneous Order for DME - ONLY FOR DME  I will give her a prescription for wheelchair when needed  Stop tramadol  Start Percocet twice daily as needed  Continue with Flexeril 10 mg 3 times a day  Continue with gabapentin 300 mg 3 times a day  Continue with duloxetine 60 mg    Primary hypertension  Slightly elevated today  She will continue with losartan 50 mg  Come back in 1 month for blood pressure recheck      SUBJECTIVE:    Jeannine Carvalho is a 31 year old female who came in today     Found to have 3.9cm cyst on left ovary from MRI w/u for lumbar back pain  Here to discuss further management of the cyst  MRI of the pelvis that was done on December 28, 2021 was reviewed.  The report stated a 3.9 cm cyst within the left adnexa.  \"This is benign in appearance\"    She has chronic back pain.  Tramadol has not been helpful.  She finds Percocet to be more helpful for her pain.  She also takes Flexeril 10 mg 3 times a day, gabapentin 300 mg 3 times a day and Cymbalta 60 mg daily    Review of Systems (except those mentioned above)  Constitutional: Negative.   HENT: Negative.   Eyes: Negative.   Respiratory: Negative.   Cardiovascular: Negative.   Gastrointestinal: Negative.   Endocrine: Negative.   Genitourinary: Negative.   Musculoskeletal: Negative.   Skin: Negative.   Allergic/Immunologic: Negative.   Neurological: Negative.   Hematological: Negative.   Psychiatric/Behavioral: Negative.     Patient Active Problem List    Diagnosis Date Noted     Back pain 07/01/2008     Priority: Medium     Viral warts 04/24/2008     Priority: Medium     Problem list name updated by automated process. Provider to review       No Known Allergies  Current Outpatient Medications   Medication Sig Dispense " Refill     cyclobenzaprine (FLEXERIL) 10 MG tablet Take 1 tablet (10 mg) by mouth 3 times daily as needed for muscle spasms 90 tablet 0     DULoxetine (CYMBALTA) 60 MG capsule Take 1 capsule (60 mg) by mouth daily 90 capsule 0     gabapentin (NEURONTIN) 300 MG capsule TAKE THREE CAPSULES BY MOUTH THREE TIMES DAILY 270 capsule 11     losartan (COZAAR) 50 MG tablet Take 1 tablet (50 mg) by mouth daily 90 tablet 1     oxyCODONE-acetaminophen (PERCOCET) 5-325 MG tablet Take 1 tablet by mouth daily as needed for severe pain Chronic back pain 30 tablet 0     traZODone (DESYREL) 100 MG tablet Take 1 tablet (100 mg) by mouth At Bedtime 90 tablet 0     VITAMIN D3 25 MCG (1000 UT) tablet Take 1 tablet by mouth daily       Past Medical History:   Diagnosis Date     Headache      Insomnia      LBP (low back pain)      Lumbar herniated disc      Lumbar radiculitis      Neuropathy      Obesity      Ovarian cyst      No past surgical history on file.  Social History     Socioeconomic History     Marital status: Single     Spouse name: None     Number of children: None     Years of education: None     Highest education level: None   Occupational History     None   Tobacco Use     Smoking status: Passive Smoke Exposure - Never Smoker     Smokeless tobacco: Never Used   Substance and Sexual Activity     Alcohol use: None     Drug use: None     Sexual activity: None   Other Topics Concern     None   Social History Narrative    Patient presented to the ED with her mother 05/09/17       Social Determinants of Health     Financial Resource Strain: Not on file   Food Insecurity: Not on file   Transportation Needs: Not on file   Physical Activity: Not on file   Stress: Not on file   Social Connections: Not on file   Intimate Partner Violence: Not on file   Housing Stability: Not on file     Family History   Problem Relation Age of Onset     Diabetes Mother      Hyperlipidemia Mother      Diabetes Father      Diabetes Sister       Hyperlipidemia Sister          OBJECTIVE:    Vitals:    01/24/22 1018 01/24/22 1121   BP: (!) 120/90 (!) 128/90   BP Location: Left arm Left arm   Patient Position: Sitting Sitting   Cuff Size: Adult Large Adult Regular   Pulse: 82    Temp: 98.1  F (36.7  C)    TempSrc: Oral    SpO2: 97%    Weight: 72.3 kg (159 lb 4.8 oz)      Body mass index is 29.14 kg/m .    Physical Exam:  Constitutional: Patient is oriented to person, place, and time. Patient appears well-developed and well-nourished. No distress.   Head: Normocephalic and atraumatic.   Right Ear: External ear normal.   Left Ear: External ear normal.   Eyes: Conjunctivae and EOM are normal. Right eye exhibits no discharge. Left eye exhibits no discharge. No scleral icterus.   Neurological: Patient is alert and oriented to person, place, and time.  Skin: No rash noted. Patient is not diaphoretic. No erythema. No pallor.

## 2022-01-25 ENCOUNTER — MYC MEDICAL ADVICE (OUTPATIENT)
Dept: FAMILY MEDICINE | Facility: CLINIC | Age: 32
End: 2022-01-25
Payer: COMMERCIAL

## 2022-01-25 DIAGNOSIS — G89.29 CHRONIC BILATERAL LOW BACK PAIN WITHOUT SCIATICA: ICD-10-CM

## 2022-01-25 DIAGNOSIS — M54.50 CHRONIC BILATERAL LOW BACK PAIN WITHOUT SCIATICA: ICD-10-CM

## 2022-01-26 ENCOUNTER — MEDICAL CORRESPONDENCE (OUTPATIENT)
Dept: HEALTH INFORMATION MANAGEMENT | Facility: CLINIC | Age: 32
End: 2022-01-26
Payer: COMMERCIAL

## 2022-01-27 RX ORDER — OXYCODONE AND ACETAMINOPHEN 5; 325 MG/1; MG/1
1 TABLET ORAL 2 TIMES DAILY PRN
Qty: 60 TABLET | Refills: 0 | Status: SHIPPED | OUTPATIENT
Start: 2022-01-27 | End: 2022-02-22

## 2022-02-01 ENCOUNTER — HOSPITAL ENCOUNTER (OUTPATIENT)
Dept: ULTRASOUND IMAGING | Facility: CLINIC | Age: 32
Discharge: HOME | End: 2022-02-01
Attending: FAMILY MEDICINE | Admitting: FAMILY MEDICINE
Payer: COMMERCIAL

## 2022-02-01 DIAGNOSIS — N83.202 CYST OF LEFT OVARY: ICD-10-CM

## 2022-02-01 PROCEDURE — 76856 US EXAM PELVIC COMPLETE: CPT

## 2022-02-13 DIAGNOSIS — G89.29 CHRONIC MIDLINE LOW BACK PAIN, UNSPECIFIED WHETHER SCIATICA PRESENT: ICD-10-CM

## 2022-02-13 DIAGNOSIS — M54.50 CHRONIC MIDLINE LOW BACK PAIN, UNSPECIFIED WHETHER SCIATICA PRESENT: ICD-10-CM

## 2022-02-14 ENCOUNTER — MYC MEDICAL ADVICE (OUTPATIENT)
Dept: FAMILY MEDICINE | Facility: CLINIC | Age: 32
End: 2022-02-14
Payer: COMMERCIAL

## 2022-02-14 DIAGNOSIS — M54.50 CHRONIC MIDLINE LOW BACK PAIN, UNSPECIFIED WHETHER SCIATICA PRESENT: ICD-10-CM

## 2022-02-14 DIAGNOSIS — G89.29 CHRONIC MIDLINE LOW BACK PAIN, UNSPECIFIED WHETHER SCIATICA PRESENT: ICD-10-CM

## 2022-02-14 RX ORDER — CYCLOBENZAPRINE HCL 10 MG
10 TABLET ORAL 3 TIMES DAILY PRN
Qty: 90 TABLET | Refills: 0 | Status: SHIPPED | OUTPATIENT
Start: 2022-02-14 | End: 2022-03-16

## 2022-02-16 RX ORDER — CYCLOBENZAPRINE HCL 10 MG
TABLET ORAL
Qty: 90 TABLET | Refills: 0 | OUTPATIENT
Start: 2022-02-16

## 2022-02-16 NOTE — TELEPHONE ENCOUNTER
Denied Rx, request already responded to by other means.     cyclobenzaprine (FLEXERIL) 10 MG tablet 90 tablet 0 2/14/2022  No   Sig - Route: Take 1 tablet (10 mg) by mouth 3 times daily as needed for muscle spasms - Oral   Sent to pharmacy as: Cyclobenzaprine HCl 10 MG Oral Tablet (FLEXERIL)   Class: E-Prescribe   Order: [id number]   E-Prescribing Status: Receipt confirmed by pharmacy (2/14/2022  1:59 PM CST)         Lucy Mak RN, BSN Nurse Triage Advisor 8:45 AM 2/16/2022

## 2022-02-22 ENCOUNTER — MYC MEDICAL ADVICE (OUTPATIENT)
Dept: FAMILY MEDICINE | Facility: CLINIC | Age: 32
End: 2022-02-22
Payer: COMMERCIAL

## 2022-02-22 DIAGNOSIS — M54.50 CHRONIC BILATERAL LOW BACK PAIN WITHOUT SCIATICA: ICD-10-CM

## 2022-02-22 DIAGNOSIS — G89.29 CHRONIC BILATERAL LOW BACK PAIN WITHOUT SCIATICA: ICD-10-CM

## 2022-02-22 RX ORDER — OXYCODONE AND ACETAMINOPHEN 5; 325 MG/1; MG/1
1 TABLET ORAL 2 TIMES DAILY PRN
Qty: 60 TABLET | Refills: 0 | Status: SHIPPED | OUTPATIENT
Start: 2022-02-22 | End: 2022-03-16

## 2022-03-15 ENCOUNTER — MYC MEDICAL ADVICE (OUTPATIENT)
Dept: FAMILY MEDICINE | Facility: CLINIC | Age: 32
End: 2022-03-15
Payer: COMMERCIAL

## 2022-03-15 DIAGNOSIS — M54.50 CHRONIC MIDLINE LOW BACK PAIN, UNSPECIFIED WHETHER SCIATICA PRESENT: ICD-10-CM

## 2022-03-15 DIAGNOSIS — G89.29 CHRONIC MIDLINE LOW BACK PAIN, UNSPECIFIED WHETHER SCIATICA PRESENT: ICD-10-CM

## 2022-03-15 NOTE — TELEPHONE ENCOUNTER
Pending Prescriptions:                       Disp   Refills    cyclobenzaprine (FLEXERIL) 10 MG tablet   90 tab*0            Sig: Take 1 tablet (10 mg) by mouth 3 times daily as           needed for muscle spasms    Last visit 1/24/22  Last fill 2/14/22

## 2022-03-16 ENCOUNTER — MYC REFILL (OUTPATIENT)
Dept: FAMILY MEDICINE | Facility: CLINIC | Age: 32
End: 2022-03-16
Payer: COMMERCIAL

## 2022-03-16 DIAGNOSIS — G89.29 CHRONIC BILATERAL LOW BACK PAIN WITHOUT SCIATICA: ICD-10-CM

## 2022-03-16 DIAGNOSIS — G89.29 CHRONIC MIDLINE LOW BACK PAIN, UNSPECIFIED WHETHER SCIATICA PRESENT: ICD-10-CM

## 2022-03-16 DIAGNOSIS — M54.50 CHRONIC BILATERAL LOW BACK PAIN WITHOUT SCIATICA: ICD-10-CM

## 2022-03-16 DIAGNOSIS — M54.50 CHRONIC MIDLINE LOW BACK PAIN, UNSPECIFIED WHETHER SCIATICA PRESENT: ICD-10-CM

## 2022-03-16 DIAGNOSIS — F39 MOOD DISORDER (H): ICD-10-CM

## 2022-03-16 RX ORDER — CYCLOBENZAPRINE HCL 10 MG
10 TABLET ORAL 3 TIMES DAILY PRN
Qty: 90 TABLET | Refills: 0 | OUTPATIENT
Start: 2022-03-16

## 2022-03-16 RX ORDER — CYCLOBENZAPRINE HCL 10 MG
10 TABLET ORAL 3 TIMES DAILY PRN
Qty: 90 TABLET | Refills: 0 | Status: SHIPPED | OUTPATIENT
Start: 2022-03-16 | End: 2022-04-14

## 2022-03-17 RX ORDER — OXYCODONE AND ACETAMINOPHEN 5; 325 MG/1; MG/1
1 TABLET ORAL 2 TIMES DAILY PRN
Qty: 60 TABLET | Refills: 0 | Status: SHIPPED | OUTPATIENT
Start: 2022-03-17 | End: 2022-04-14

## 2022-03-17 RX ORDER — DULOXETIN HYDROCHLORIDE 60 MG/1
60 CAPSULE, DELAYED RELEASE ORAL DAILY
Qty: 90 CAPSULE | Refills: 0 | Status: SHIPPED | OUTPATIENT
Start: 2022-03-17 | End: 2022-06-09

## 2022-03-17 NOTE — TELEPHONE ENCOUNTER
"Routing refill request to provider for review/approval because:  Blood pressure  Controlled substance    Last Written Prescription Date:  12/20/2021  Last Fill Quantity: 90,  # refills: 0   Last office visit provider:  1/24/2022     Requested Prescriptions   Pending Prescriptions Disp Refills     DULoxetine (CYMBALTA) 60 MG capsule 90 capsule 0     Sig: Take 1 capsule (60 mg) by mouth daily       Serotonin-Norepinephrine Reuptake Inhibitors  Failed - 3/16/2022  4:06 PM        Failed - Blood pressure under 140/90 in past 12 months     BP Readings from Last 3 Encounters:   01/24/22 (!) 128/90   12/20/21 (!) 120/108   10/04/21 (!) 120/90                 Passed - Recent (12 mo) or future (30 days) visit within the authorizing provider's specialty     Patient has had an office visit with the authorizing provider or a provider within the authorizing providers department within the previous 12 mos or has a future within next 30 days. See \"Patient Info\" tab in inbasket, or \"Choose Columns\" in Meds & Orders section of the refill encounter.              Passed - Medication is active on med list        Passed - Patient is age 18 or older        Passed - No active pregnancy on record        Passed - No positive pregnancy test in past 12 months      Last Written Prescription Date:  2/22/2022  Last Fill Quantity: 60,  # refills: 0   Last office visit provider:  1/24/2022        oxyCODONE-acetaminophen (PERCOCET) 5-325 MG tablet 60 tablet 0     Sig: Take 1 tablet by mouth 2 times daily as needed for severe pain Chronic back pain       There is no refill protocol information for this order          Porsha Cosme RN 03/17/22 7:21 AM  "

## 2022-04-14 ENCOUNTER — MYC MEDICAL ADVICE (OUTPATIENT)
Dept: FAMILY MEDICINE | Facility: CLINIC | Age: 32
End: 2022-04-14
Payer: COMMERCIAL

## 2022-04-14 ENCOUNTER — MYC REFILL (OUTPATIENT)
Dept: FAMILY MEDICINE | Facility: CLINIC | Age: 32
End: 2022-04-14
Payer: COMMERCIAL

## 2022-04-14 DIAGNOSIS — G89.29 CHRONIC BILATERAL LOW BACK PAIN WITHOUT SCIATICA: ICD-10-CM

## 2022-04-14 DIAGNOSIS — M54.50 CHRONIC BILATERAL LOW BACK PAIN WITHOUT SCIATICA: ICD-10-CM

## 2022-04-14 DIAGNOSIS — M54.50 CHRONIC MIDLINE LOW BACK PAIN, UNSPECIFIED WHETHER SCIATICA PRESENT: ICD-10-CM

## 2022-04-14 DIAGNOSIS — G89.29 CHRONIC MIDLINE LOW BACK PAIN, UNSPECIFIED WHETHER SCIATICA PRESENT: ICD-10-CM

## 2022-04-14 RX ORDER — CYCLOBENZAPRINE HCL 10 MG
10 TABLET ORAL 3 TIMES DAILY PRN
Qty: 90 TABLET | Refills: 0 | Status: SHIPPED | OUTPATIENT
Start: 2022-04-14 | End: 2022-05-12

## 2022-04-14 RX ORDER — OXYCODONE AND ACETAMINOPHEN 5; 325 MG/1; MG/1
1 TABLET ORAL 2 TIMES DAILY PRN
Qty: 60 TABLET | Refills: 0 | Status: SHIPPED | OUTPATIENT
Start: 2022-04-14 | End: 2022-05-12

## 2022-04-14 NOTE — TELEPHONE ENCOUNTER
Refill request for medication: oxycodone-acetaminophen 5/325 mg   Last visit addressing this medication: 1/24/22  Follow up plan 1  months  Last refill on 3/17/22, quantity #60   CSA completed not on file   Date PDMP was last reviewed never reviewed

## 2022-04-17 RX ORDER — CYCLOBENZAPRINE HCL 10 MG
10 TABLET ORAL 3 TIMES DAILY PRN
Qty: 90 TABLET | Refills: 0 | OUTPATIENT
Start: 2022-04-17

## 2022-04-17 RX ORDER — OXYCODONE AND ACETAMINOPHEN 5; 325 MG/1; MG/1
1 TABLET ORAL 2 TIMES DAILY PRN
Qty: 60 TABLET | Refills: 0 | OUTPATIENT
Start: 2022-04-17

## 2022-04-27 ENCOUNTER — OFFICE VISIT (OUTPATIENT)
Dept: FAMILY MEDICINE | Facility: CLINIC | Age: 32
End: 2022-04-27
Payer: COMMERCIAL

## 2022-04-27 VITALS
SYSTOLIC BLOOD PRESSURE: 120 MMHG | BODY MASS INDEX: 28.72 KG/M2 | DIASTOLIC BLOOD PRESSURE: 94 MMHG | HEART RATE: 84 BPM | WEIGHT: 157 LBS

## 2022-04-27 DIAGNOSIS — G89.29 CHRONIC BILATERAL LOW BACK PAIN WITHOUT SCIATICA: ICD-10-CM

## 2022-04-27 DIAGNOSIS — F51.01 PRIMARY INSOMNIA: ICD-10-CM

## 2022-04-27 DIAGNOSIS — F39 MOOD DISORDER (H): ICD-10-CM

## 2022-04-27 DIAGNOSIS — I10 PRIMARY HYPERTENSION: Primary | ICD-10-CM

## 2022-04-27 DIAGNOSIS — M54.50 CHRONIC BILATERAL LOW BACK PAIN WITHOUT SCIATICA: ICD-10-CM

## 2022-04-27 PROCEDURE — 99214 OFFICE O/P EST MOD 30 MIN: CPT | Performed by: FAMILY MEDICINE

## 2022-04-27 RX ORDER — LOSARTAN POTASSIUM 50 MG/1
50 TABLET ORAL 2 TIMES DAILY
Qty: 180 TABLET | Refills: 0 | Status: SHIPPED | OUTPATIENT
Start: 2022-04-27 | End: 2022-06-09

## 2022-04-27 NOTE — PROGRESS NOTES
ASSESSMENT/PLAN:  Jeannine was seen today for blood pressure check.    Diagnoses and all orders for this visit:    Primary hypertension  -     losartan (COZAAR) 50 MG tablet; Take 1 tablet (50 mg) by mouth 2 times daily  Increase dose to BID  Counseled healthy lifestyle modifications  Discussed stress reduction techniques  Follow-up in 3 month    Morbid obesity  Counseled healthy lifestyle modifications    Chronic bilateral low back pain without sciatica  appt with pain clinic next month  Percocet twice daily, gabapentin 300 mg 3 times daily, Cymbalta 60 mg, Flexeril 10 mg 3 times daily)    Primary insomnia  Stable on trazodone    SUBJECTIVE:    Jeannine Carvalho is a 31 year old female who came in today     Here for BP check  home Bps have been high (151/111, 140/90, 120/90  Currently taking losartan 50mg  No chest pain, no SOB, no palpitation  Has been feeling more stressed recently due to failing health of her dog    Has chronic back pain and will be seeing pain clinic next month.  Current pain meds include: Percocet twice daily, gabapentin 300 mg 3 times daily, Cymbalta 60 mg, Flexeril 10 mg 3 times daily)    Review of Systems (except those mentioned above)  Constitutional: Negative.   HENT: Negative.   Eyes: Negative.   Respiratory: Negative.   Cardiovascular: Negative.   Gastrointestinal: Negative.   Endocrine: Negative.   Genitourinary: Negative.   Musculoskeletal: Negative.   Skin: Negative.   Allergic/Immunologic: Negative.   Neurological: Negative.   Hematological: Negative.   Psychiatric/Behavioral: Negative.     Patient Active Problem List    Diagnosis Date Noted     Primary hypertension 04/27/2022     Priority: Medium     Primary insomnia 04/27/2022     Priority: Medium     Back pain 07/01/2008     Priority: Medium     Viral warts 04/24/2008     Priority: Medium     Problem list name updated by automated process. Provider to review       No Known Allergies  Current Outpatient Medications   Medication Sig  Dispense Refill     cyclobenzaprine (FLEXERIL) 10 MG tablet Take 1 tablet (10 mg) by mouth 3 times daily as needed for muscle spasms 90 tablet 0     DULoxetine (CYMBALTA) 60 MG capsule Take 1 capsule (60 mg) by mouth daily 90 capsule 0     gabapentin (NEURONTIN) 300 MG capsule TAKE THREE CAPSULES BY MOUTH THREE TIMES DAILY 270 capsule 11     hydrOXYzine (VISTARIL) 25 MG capsule TAKE 1 CAPSULE BY MOUTH THREE TIMES DAILY       losartan (COZAAR) 50 MG tablet Take 1 tablet (50 mg) by mouth 2 times daily 180 tablet 0     oxyCODONE-acetaminophen (PERCOCET) 5-325 MG tablet Take 1 tablet by mouth 2 times daily as needed for severe pain Chronic back pain 60 tablet 0     traZODone (DESYREL) 100 MG tablet Take 1 tablet (100 mg) by mouth At Bedtime 90 tablet 0     VITAMIN D3 25 MCG (1000 UT) tablet Take 1 tablet by mouth daily       Past Medical History:   Diagnosis Date     Headache      Insomnia      LBP (low back pain)      Lumbar herniated disc      Lumbar radiculitis      Neuropathy      Obesity      Ovarian cyst      No past surgical history on file.  Social History     Socioeconomic History     Marital status: Single   Tobacco Use     Smoking status: Passive Smoke Exposure - Never Smoker     Smokeless tobacco: Never Used   Social History Narrative    Patient presented to the ED with her mother 05/09/17       Family History   Problem Relation Age of Onset     Diabetes Mother      Hyperlipidemia Mother      Diabetes Father      Diabetes Sister      Hyperlipidemia Sister          OBJECTIVE:    Vitals:    04/27/22 1239   BP: (!) 120/94   Pulse: 84   Weight: 71.2 kg (157 lb)     Body mass index is 28.72 kg/m .    Physical Exam:  Constitutional: Patient was oriented to person, place, and time. Patient appeared well-developed and well-nourished. No distress.   Head: Normocephalic and atraumatic.   Right Ear: External ear normal.   Left Ear: External ear normal.   Eyes: Conjunctivae and EOM were normal. Right eye exhibited no  discharge. Left eye exhibited no discharge. No scleral icterus.   Cardiovascular: Normal rate, regular rhythm, normal heart sounds and intact distal pulses. No murmur heard.   Pulmonary/Chest: Effort normal and breath sounds normal. No stridor. No respiratory distress. Patient had no wheezes, no rales, exhibits no tenderness.    Skin: Skin was warm and dry. No rash noted. Patient was not diaphoretic. No erythema. No pallor.       No results found for any visits on 04/27/22.

## 2022-05-10 ENCOUNTER — MYC MEDICAL ADVICE (OUTPATIENT)
Dept: FAMILY MEDICINE | Facility: CLINIC | Age: 32
End: 2022-05-10
Payer: COMMERCIAL

## 2022-05-11 ENCOUNTER — MYC MEDICAL ADVICE (OUTPATIENT)
Dept: FAMILY MEDICINE | Facility: CLINIC | Age: 32
End: 2022-05-11
Payer: COMMERCIAL

## 2022-05-11 DIAGNOSIS — M54.50 CHRONIC MIDLINE LOW BACK PAIN, UNSPECIFIED WHETHER SCIATICA PRESENT: ICD-10-CM

## 2022-05-11 DIAGNOSIS — G89.29 CHRONIC BILATERAL LOW BACK PAIN WITHOUT SCIATICA: ICD-10-CM

## 2022-05-11 DIAGNOSIS — G89.29 CHRONIC MIDLINE LOW BACK PAIN, UNSPECIFIED WHETHER SCIATICA PRESENT: ICD-10-CM

## 2022-05-11 DIAGNOSIS — M54.50 CHRONIC BILATERAL LOW BACK PAIN WITHOUT SCIATICA: ICD-10-CM

## 2022-05-11 NOTE — TELEPHONE ENCOUNTER
Patient last seen 4/27/2022. Was to follow up with pain clinic next month. Patient scheduled to see PCP 5/19.  Refill pended. Please advise.  Mayda Machuca LPN

## 2022-05-12 RX ORDER — OXYCODONE AND ACETAMINOPHEN 5; 325 MG/1; MG/1
1 TABLET ORAL 2 TIMES DAILY PRN
Qty: 60 TABLET | Refills: 0 | Status: SHIPPED | OUTPATIENT
Start: 2022-05-12 | End: 2022-06-09

## 2022-05-12 RX ORDER — CYCLOBENZAPRINE HCL 10 MG
10 TABLET ORAL 3 TIMES DAILY PRN
Qty: 90 TABLET | Refills: 0 | Status: SHIPPED | OUTPATIENT
Start: 2022-05-12 | End: 2022-06-09

## 2022-05-23 ENCOUNTER — MYC MEDICAL ADVICE (OUTPATIENT)
Dept: FAMILY MEDICINE | Facility: CLINIC | Age: 32
End: 2022-05-23
Payer: COMMERCIAL

## 2022-05-23 ENCOUNTER — MYC REFILL (OUTPATIENT)
Dept: FAMILY MEDICINE | Facility: CLINIC | Age: 32
End: 2022-05-23
Payer: COMMERCIAL

## 2022-05-23 DIAGNOSIS — F39 MOOD DISORDER (H): ICD-10-CM

## 2022-05-23 DIAGNOSIS — F51.01 PRIMARY INSOMNIA: ICD-10-CM

## 2022-05-23 RX ORDER — TRAZODONE HYDROCHLORIDE 100 MG/1
100 TABLET ORAL AT BEDTIME
Qty: 90 TABLET | Refills: 0 | Status: SHIPPED | OUTPATIENT
Start: 2022-05-23 | End: 2022-06-09

## 2022-05-23 RX ORDER — TRAZODONE HYDROCHLORIDE 100 MG/1
100 TABLET ORAL AT BEDTIME
Qty: 90 TABLET | Refills: 0 | Status: CANCELLED | OUTPATIENT
Start: 2022-05-23

## 2022-06-07 ENCOUNTER — MYC REFILL (OUTPATIENT)
Dept: FAMILY MEDICINE | Facility: CLINIC | Age: 32
End: 2022-06-07

## 2022-06-07 ENCOUNTER — MYC MEDICAL ADVICE (OUTPATIENT)
Dept: FAMILY MEDICINE | Facility: CLINIC | Age: 32
End: 2022-06-07
Payer: COMMERCIAL

## 2022-06-07 DIAGNOSIS — G89.29 CHRONIC MIDLINE LOW BACK PAIN, UNSPECIFIED WHETHER SCIATICA PRESENT: ICD-10-CM

## 2022-06-07 DIAGNOSIS — G89.29 CHRONIC BILATERAL LOW BACK PAIN WITHOUT SCIATICA: ICD-10-CM

## 2022-06-07 DIAGNOSIS — M54.50 CHRONIC BILATERAL LOW BACK PAIN WITHOUT SCIATICA: ICD-10-CM

## 2022-06-07 DIAGNOSIS — M54.50 CHRONIC MIDLINE LOW BACK PAIN, UNSPECIFIED WHETHER SCIATICA PRESENT: ICD-10-CM

## 2022-06-07 RX ORDER — OXYCODONE AND ACETAMINOPHEN 5; 325 MG/1; MG/1
1 TABLET ORAL 2 TIMES DAILY PRN
Qty: 60 TABLET | Refills: 0 | OUTPATIENT
Start: 2022-06-07

## 2022-06-07 RX ORDER — CYCLOBENZAPRINE HCL 10 MG
10 TABLET ORAL 3 TIMES DAILY PRN
Qty: 90 TABLET | Refills: 0 | OUTPATIENT
Start: 2022-06-07

## 2022-06-09 ENCOUNTER — OFFICE VISIT (OUTPATIENT)
Dept: FAMILY MEDICINE | Facility: CLINIC | Age: 32
End: 2022-06-09
Payer: COMMERCIAL

## 2022-06-09 VITALS
HEART RATE: 126 BPM | SYSTOLIC BLOOD PRESSURE: 118 MMHG | OXYGEN SATURATION: 100 % | HEIGHT: 62 IN | BODY MASS INDEX: 28.21 KG/M2 | DIASTOLIC BLOOD PRESSURE: 84 MMHG | WEIGHT: 153.31 LBS

## 2022-06-09 DIAGNOSIS — R00.0 TACHYCARDIA: ICD-10-CM

## 2022-06-09 DIAGNOSIS — F51.01 PRIMARY INSOMNIA: ICD-10-CM

## 2022-06-09 DIAGNOSIS — M54.41 CHRONIC BILATERAL LOW BACK PAIN WITH BILATERAL SCIATICA: ICD-10-CM

## 2022-06-09 DIAGNOSIS — I10 PRIMARY HYPERTENSION: ICD-10-CM

## 2022-06-09 DIAGNOSIS — Z23 ENCOUNTER FOR ADMINISTRATION OF VACCINE: ICD-10-CM

## 2022-06-09 DIAGNOSIS — G89.29 CHRONIC BILATERAL LOW BACK PAIN WITH BILATERAL SCIATICA: ICD-10-CM

## 2022-06-09 DIAGNOSIS — F39 MOOD DISORDER (H): ICD-10-CM

## 2022-06-09 DIAGNOSIS — Z79.899 ENCOUNTER FOR LONG-TERM (CURRENT) USE OF MEDICATIONS: Primary | ICD-10-CM

## 2022-06-09 DIAGNOSIS — M54.42 CHRONIC BILATERAL LOW BACK PAIN WITH BILATERAL SCIATICA: ICD-10-CM

## 2022-06-09 LAB — CREAT UR-MCNC: 50 MG/DL

## 2022-06-09 PROCEDURE — 80307 DRUG TEST PRSMV CHEM ANLYZR: CPT | Performed by: FAMILY MEDICINE

## 2022-06-09 PROCEDURE — 90471 IMMUNIZATION ADMIN: CPT | Performed by: FAMILY MEDICINE

## 2022-06-09 PROCEDURE — 90714 TD VACC NO PRESV 7 YRS+ IM: CPT | Performed by: FAMILY MEDICINE

## 2022-06-09 PROCEDURE — 93010 ELECTROCARDIOGRAM REPORT: CPT | Performed by: GENERAL ACUTE CARE HOSPITAL

## 2022-06-09 PROCEDURE — 93005 ELECTROCARDIOGRAM TRACING: CPT | Performed by: FAMILY MEDICINE

## 2022-06-09 PROCEDURE — 99214 OFFICE O/P EST MOD 30 MIN: CPT | Mod: 25 | Performed by: FAMILY MEDICINE

## 2022-06-09 RX ORDER — CYCLOBENZAPRINE HCL 10 MG
10 TABLET ORAL 3 TIMES DAILY PRN
Qty: 90 TABLET | Refills: 3 | Status: SHIPPED | OUTPATIENT
Start: 2022-06-09 | End: 2022-09-28

## 2022-06-09 RX ORDER — GABAPENTIN 300 MG/1
CAPSULE ORAL
Qty: 90 CAPSULE | Refills: 3 | Status: SHIPPED | OUTPATIENT
Start: 2022-06-09 | End: 2022-08-22

## 2022-06-09 RX ORDER — OXYCODONE AND ACETAMINOPHEN 5; 325 MG/1; MG/1
1 TABLET ORAL 2 TIMES DAILY PRN
Qty: 60 TABLET | Refills: 0 | Status: SHIPPED | OUTPATIENT
Start: 2022-06-09 | End: 2022-07-06

## 2022-06-09 RX ORDER — DULOXETIN HYDROCHLORIDE 60 MG/1
60 CAPSULE, DELAYED RELEASE ORAL DAILY
Qty: 90 CAPSULE | Refills: 3 | Status: SHIPPED | OUTPATIENT
Start: 2022-06-09 | End: 2022-10-19

## 2022-06-09 RX ORDER — LOSARTAN POTASSIUM 50 MG/1
50 TABLET ORAL 2 TIMES DAILY
Qty: 180 TABLET | Refills: 3 | Status: SHIPPED | OUTPATIENT
Start: 2022-06-09 | End: 2022-08-02

## 2022-06-09 RX ORDER — TRAZODONE HYDROCHLORIDE 100 MG/1
100 TABLET ORAL AT BEDTIME
Qty: 90 TABLET | Refills: 3 | Status: SHIPPED | OUTPATIENT
Start: 2022-06-09 | End: 2022-10-19

## 2022-06-09 NOTE — PROGRESS NOTES
Assessment & Plan     Jeannine was seen today for recheck medication.    Diagnoses and all orders for this visit:    Encounter for long-term (current) use of medications, Chronic bilateral low back pain with bilateral sciatica and knee pain  -     [id number] - Urine Drug Confirmation Panel (Comprehensive); Future  -     oxyCODONE-acetaminophen (PERCOCET) 5-325 MG tablet; Take 1 tablet by mouth 2 times daily as needed for severe pain Chronic back pain  -     gabapentin (NEURONTIN) 300 MG capsule; TAKE THREE CAPSULES BY MOUTH THREE TIMES DAILY  -     cyclobenzaprine (FLEXERIL) 10 MG tablet; Take 1 tablet (10 mg) by mouth 3 times daily as needed for muscle spasms  May add Tylenol 1000 mg 3 times a day as needed and ibuprofen 800 mg 3 times a day as needed for pain  Follow-up in 3 months    Encounter for administration of vaccine  -     TD PRESERV FREE, IM (7+ YRS) (DECAVAC/TENIVAC)    Tachycardia  -     EKG 12-lead, tracing only  I personally reviewed the EKG which shows sinus tachycardia.  She had labs done recently which showed unremarkable CBC and thyroid.  We will monitor for now.  May want to consider an alternative beta-blocker (carvedilol) as she was not tolerant to metoprolol  Follow-up in 6 months    Primary hypertension  -     losartan (COZAAR) 50 MG tablet; Take 1 tablet (50 mg) by mouth 2 times daily  Stable at this time.  Follow-up in 6 months    Primary insomnia  -     traZODone (DESYREL) 100 MG tablet; Take 1 tablet (100 mg) by mouth At Bedtime  Stable.  Follow-up in 6 months    Mood disorder (H)  -     traZODone (DESYREL) 100 MG tablet; Take 1 tablet (100 mg) by mouth At Bedtime  -     DULoxetine (CYMBALTA) 60 MG capsule; Take 1 capsule (60 mg) by mouth daily  Stable.  Follow-up in 6 months      Magda Reeves MD  [facility]   Jeannine is a 31 year old who presents for the following health issues     History of Present Illness       Back Pain:  She  "presents for follow up of back pain. Patient's back pain is a chronic problem.  Location of back pain:  Right lower back, left lower back, right buttock, left buttock, right hip and left hip  Description of back pain: burning, sharp, shooting and stabbing  Back pain spreads: right buttocks, left buttocks, right thigh and left thigh    Since patient first noticed back pain, pain is: unchanged  Does back pain interfere with her job:  Yes      Hypertension: She presents for follow up of hypertension.  She does check blood pressure  regularly outside of the clinic. Outside blood pressures have been over 140/90. She does not follow a low salt diet.         Pain clinic was scheduled for 6/22/22 but canceled by the clinic and does not have a rescheduled appt  Chronic lower back with occasional burning to both upper legs  Takes percocet BID, gabapentin TID, flexeril TID, and cymbalta.    Elevated pulse today  No chest pain, no SOB        Objective    /84 (BP Location: Left arm, Patient Position: Sitting, Cuff Size: Adult Regular)   Pulse (!) 126   Ht 1.575 m (5' 2\")   Wt 69.5 kg (153 lb 5 oz)   LMP 05/04/2022 (Approximate)   SpO2 100%   BMI 28.04 kg/m    Body mass index is 28.04 kg/m .  Physical Exam   Constitutional: Patient is oriented to person, place, and time. Patient appears well-developed and well-nourished. No distress.   Head: Normocephalic and atraumatic.   Right Ear: External ear normal.   Left Ear: External ear normal.   Eyes: Conjunctivae and EOM are normal. Right eye exhibits no discharge. Left eye exhibits no discharge. No scleral icterus.   Neurological: Patient is alert and oriented to person, place, and time.  Skin: No rash noted. Patient is not diaphoretic. No erythema. No pallor.        "

## 2022-06-09 NOTE — PATIENT INSTRUCTIONS
If needed, may add tylenol 1000mg every 8 hours as needed for pain.  May also add ibuprofen 800mg every 8 hours as needed.

## 2022-06-13 LAB — GABAPENTIN UR QL CFM: PRESENT

## 2022-06-17 LAB
ATRIAL RATE - MUSE: 112 BPM
DIASTOLIC BLOOD PRESSURE - MUSE: NORMAL MMHG
INTERPRETATION ECG - MUSE: NORMAL
P AXIS - MUSE: 47 DEGREES
PR INTERVAL - MUSE: 166 MS
QRS DURATION - MUSE: 68 MS
QT - MUSE: 328 MS
QTC - MUSE: 447 MS
R AXIS - MUSE: 24 DEGREES
SYSTOLIC BLOOD PRESSURE - MUSE: NORMAL MMHG
T AXIS - MUSE: 45 DEGREES
VENTRICULAR RATE- MUSE: 112 BPM

## 2022-07-03 ENCOUNTER — OFFICE VISIT (OUTPATIENT)
Dept: URGENT CARE | Facility: URGENT CARE | Age: 32
End: 2022-07-03
Payer: COMMERCIAL

## 2022-07-03 VITALS
HEIGHT: 62 IN | BODY MASS INDEX: 27.97 KG/M2 | SYSTOLIC BLOOD PRESSURE: 103 MMHG | HEART RATE: 97 BPM | WEIGHT: 152 LBS | OXYGEN SATURATION: 98 % | DIASTOLIC BLOOD PRESSURE: 73 MMHG | TEMPERATURE: 98.3 F

## 2022-07-03 DIAGNOSIS — K04.7 DENTAL INFECTION: Primary | ICD-10-CM

## 2022-07-03 PROCEDURE — 99213 OFFICE O/P EST LOW 20 MIN: CPT | Performed by: FAMILY MEDICINE

## 2022-07-03 RX ORDER — AMOXICILLIN 500 MG/1
1000 CAPSULE ORAL 2 TIMES DAILY
Qty: 40 CAPSULE | Refills: 0 | Status: SHIPPED | OUTPATIENT
Start: 2022-07-03 | End: 2022-07-13

## 2022-07-03 RX ORDER — IBUPROFEN 200 MG
200 TABLET ORAL EVERY 4 HOURS PRN
COMMUNITY
End: 2022-07-27

## 2022-07-03 NOTE — PROGRESS NOTES
Subjective: Patient has very poor dentition and for the past week the right lower back molar has been very painful.  There is no drainage but now she has swelling in the right lower cheek.  She plans to have the rest of her teeth pulled as they do not think there is anything they can do to save them.    Objective: Many teeth are missing.  Her right first molar is the only one present and I can see redness or swelling around it but she definitely has tender swelling in the jaw in that area but no adenopathy.    Assessment and plan: Dental infection.  Amoxicillin for 10 days pending getting into see dentist.

## 2022-07-06 DIAGNOSIS — M54.41 CHRONIC BILATERAL LOW BACK PAIN WITH BILATERAL SCIATICA: ICD-10-CM

## 2022-07-06 DIAGNOSIS — G89.29 CHRONIC BILATERAL LOW BACK PAIN WITH BILATERAL SCIATICA: ICD-10-CM

## 2022-07-06 DIAGNOSIS — M54.42 CHRONIC BILATERAL LOW BACK PAIN WITH BILATERAL SCIATICA: ICD-10-CM

## 2022-07-06 RX ORDER — OXYCODONE AND ACETAMINOPHEN 5; 325 MG/1; MG/1
1 TABLET ORAL 2 TIMES DAILY PRN
Qty: 60 TABLET | Refills: 0 | Status: SHIPPED | OUTPATIENT
Start: 2022-07-06 | End: 2022-08-02

## 2022-07-22 ENCOUNTER — OFFICE VISIT (OUTPATIENT)
Dept: URGENT CARE | Facility: URGENT CARE | Age: 32
End: 2022-07-22
Payer: COMMERCIAL

## 2022-07-22 VITALS
SYSTOLIC BLOOD PRESSURE: 98 MMHG | BODY MASS INDEX: 28.35 KG/M2 | OXYGEN SATURATION: 99 % | HEART RATE: 78 BPM | TEMPERATURE: 98.2 F | DIASTOLIC BLOOD PRESSURE: 71 MMHG | WEIGHT: 155 LBS

## 2022-07-22 DIAGNOSIS — R22.0 FACIAL SWELLING: ICD-10-CM

## 2022-07-22 DIAGNOSIS — K04.7 DENTAL INFECTION: Primary | ICD-10-CM

## 2022-07-22 PROCEDURE — 99214 OFFICE O/P EST MOD 30 MIN: CPT | Mod: 25 | Performed by: PHYSICIAN ASSISTANT

## 2022-07-22 PROCEDURE — 96372 THER/PROPH/DIAG INJ SC/IM: CPT | Performed by: PHYSICIAN ASSISTANT

## 2022-07-22 RX ORDER — CEFTRIAXONE SODIUM 1 G
1 VIAL (EA) INJECTION ONCE
Status: COMPLETED | OUTPATIENT
Start: 2022-07-22 | End: 2022-07-22

## 2022-07-22 RX ADMIN — Medication 1 G: at 11:48

## 2022-07-22 NOTE — PATIENT INSTRUCTIONS
[facility]    ER with fever or sense of illness    Probiotic (culturelle) one hour after each antibiotic dose

## 2022-07-22 NOTE — PROGRESS NOTES
SUBJECTIVE:  Jeannine Carvalho is a 31 year old female who presents to the clinic today for infection at fractured tooth site, recently treatedd with AB, but came back after a week of relief.  Now with swelling of jaw at site.    No fever, no sweats, no chills. No cardiac symptoms.    Past Medical History:   Diagnosis Date     Headache      Insomnia      LBP (low back pain)      Lumbar herniated disc      Lumbar radiculitis      Neuropathy      Obesity      Ovarian cyst      Current Outpatient Medications   Medication Sig Dispense Refill     cyclobenzaprine (FLEXERIL) 10 MG tablet Take 1 tablet (10 mg) by mouth 3 times daily as needed for muscle spasms 90 tablet 3     DULoxetine (CYMBALTA) 60 MG capsule Take 1 capsule (60 mg) by mouth daily 90 capsule 3     gabapentin (NEURONTIN) 300 MG capsule TAKE THREE CAPSULES BY MOUTH THREE TIMES DAILY 90 capsule 3     ibuprofen (ADVIL/MOTRIN) 200 MG tablet Take 200 mg by mouth every 4 hours as needed for mild pain       losartan (COZAAR) 50 MG tablet Take 1 tablet (50 mg) by mouth 2 times daily 180 tablet 3     oxyCODONE-acetaminophen (PERCOCET) 5-325 MG tablet Take 1 tablet by mouth 2 times daily as needed for severe pain Chronic back pain 60 tablet 0     traZODone (DESYREL) 100 MG tablet Take 1 tablet (100 mg) by mouth At Bedtime 90 tablet 3     VITAMIN D3 25 MCG (1000 UT) tablet Take 1 tablet by mouth daily       Social History     Tobacco Use     Smoking status: Passive Smoke Exposure - Never Smoker     Smokeless tobacco: Never Used   Substance Use Topics     Alcohol use: Not on file       ROS:  Review of systems negative except as stated above.    EXAM:   BP 98/71   Pulse 78   Temp 98.2  F (36.8  C) (Oral)   Wt 70.3 kg (155 lb)   SpO2 99%   BMI 28.35 kg/m    GENERAL: alert, no acute distress.  SKIN: warm swelling of cheek at associated jaw site  ENT: significant damage to teeth at site of pain  RESP: lungs clear to auscultation - no rales, rhonchi or wheezes  CV:  regular rates and rhythm, normal S1 S2, no murmur noted  NEURO: Normal strength and tone, sensory exam grossly normal,  normal speech and mentation    ASSESSMENT:  (K04.7) Dental infection  (primary encounter diagnosis)  Plan: cefTRIAXone (ROCEPHIN) injection 1 g,         amoxicillin-clavulanate (AUGMENTIN) 875-125 MG         tablet      (R22.0) Facial swelling  Plan: cefTRIAXone (ROCEPHIN) injection 1 g,         amoxicillin-clavulanate (AUGMENTIN) 875-125 MG         tablet      Patient Instructions   [facility]    ER with fever or sense of illness    Probiotic (culturelle) one hour after each antibiotic dose

## 2022-07-23 ENCOUNTER — NURSE TRIAGE (OUTPATIENT)
Dept: NURSING | Facility: CLINIC | Age: 32
End: 2022-07-23

## 2022-07-23 NOTE — TELEPHONE ENCOUNTER
"Pt's dad is calling with [facility] on file and pt is present to give verbal consent to speak with him     Seen for an infected tooth yesterday and started on antibiotics     Now today a \"big pocket of pus\" formed around the tooth and she is rating her pain a 10/10 even with prescribed pain pills     She does not have a dentist and she is not able to be seen until next month     Face is swollen but she is afebrile with temp of 97.7     Advised that she go to ED now, dad states they are agreeable to this and will start heading over there now    Reason for Disposition    [1] SEVERE pain (e.g., excruciating, unable to do any normal activities) AND [2] not improved 2 hours after pain medicine    Additional Information    Negative: Shock suspected (e.g., cold/pale/clammy skin, too weak to stand, low BP, rapid pulse)    Negative: [1] Similar pain previously AND [2] it was from \"heart attack\"    Negative: [1] Similar pain previously AND [2] it was from \"angina\" AND [3] not relieved by nitroglycerin    Negative: Sounds like a life-threatening emergency to the triager    Negative: Chest pain    Negative: Toothache followed tooth injury    Negative: Toothache or mouth pain after tooth extraction    Negative: Canker sore (i.e., aphthous ulcer)    Negative: Tongue is very swollen and tender    Negative: [1] Face is swollen AND [2] fever    Negative: Patient sounds very sick or weak to the triager    Protocols used: TOOTHACHE-A-      Gini Vance RN [facility] July 23, 2022 4:58 PM  "

## 2022-07-27 ENCOUNTER — OFFICE VISIT (OUTPATIENT)
Dept: FAMILY MEDICINE | Facility: CLINIC | Age: 32
End: 2022-07-27
Payer: COMMERCIAL

## 2022-07-27 VITALS
BODY MASS INDEX: 27.4 KG/M2 | DIASTOLIC BLOOD PRESSURE: 80 MMHG | WEIGHT: 149.8 LBS | HEART RATE: 83 BPM | SYSTOLIC BLOOD PRESSURE: 120 MMHG

## 2022-07-27 DIAGNOSIS — R00.0 TACHYCARDIA: Primary | ICD-10-CM

## 2022-07-27 PROCEDURE — 99213 OFFICE O/P EST LOW 20 MIN: CPT | Performed by: FAMILY MEDICINE

## 2022-07-27 NOTE — PROGRESS NOTES
Assessment & Plan     Tachycardia  Tachycardia has resolved.  Pulse is 83 bpm here in the clinic.  Her home pulse has been 80 to 90 bpm according to her Fitbit.    HTN  Continue with losartan 50 mg twice daily  Follow-up in 6 months    Magda Reeves MD  [facility] JAROD Paez is a 31 year old ACCOMPANIED BY mom, presenting for the following health issues:  Tachycardia (Discuss rapid heart rate )      History of Present Illness       Hypertension: She presents for follow up of hypertension.  She does check blood pressure  regularly outside of the clinic. Outside blood pressures have been over 140/90. She does not follow a low salt diet.       Here for pulse check.  Patient has had recent tachycardia episodes.  EKG also revealed a pulse of 112 bpm.  Patient does not have any cardiac or pulmonary symptoms.  She is on losartan for hypertension.      Objective    /80   Pulse 83   Wt 67.9 kg (149 lb 12.8 oz)   BMI 27.40 kg/m    Body mass index is 27.4 kg/m .  Physical Exam     Constitutional: Patient is oriented to person, place, and time. Patient appears well-developed and well-nourished. No distress.   Head: Normocephalic and atraumatic.   Right Ear: External ear normal.   Left Ear: External ear normal.   Eyes: Conjunctivae and EOM are normal. Right eye exhibits no discharge. Left eye exhibits no discharge. No scleral icterus.   Neurological: Patient is alert and oriented to person, place, and time.  Skin: No rash noted. Patient is not diaphoretic. No erythema. No pallor.

## 2022-08-02 ENCOUNTER — MYC MEDICAL ADVICE (OUTPATIENT)
Dept: FAMILY MEDICINE | Facility: CLINIC | Age: 32
End: 2022-08-02

## 2022-08-02 DIAGNOSIS — I10 PRIMARY HYPERTENSION: ICD-10-CM

## 2022-08-02 DIAGNOSIS — G89.29 CHRONIC BILATERAL LOW BACK PAIN WITH BILATERAL SCIATICA: ICD-10-CM

## 2022-08-02 DIAGNOSIS — M54.41 CHRONIC BILATERAL LOW BACK PAIN WITH BILATERAL SCIATICA: ICD-10-CM

## 2022-08-02 DIAGNOSIS — M54.42 CHRONIC BILATERAL LOW BACK PAIN WITH BILATERAL SCIATICA: ICD-10-CM

## 2022-08-02 RX ORDER — OXYCODONE AND ACETAMINOPHEN 5; 325 MG/1; MG/1
1 TABLET ORAL 2 TIMES DAILY PRN
Qty: 60 TABLET | Refills: 0 | Status: SHIPPED | OUTPATIENT
Start: 2022-08-02 | End: 2022-08-30

## 2022-08-02 RX ORDER — LOSARTAN POTASSIUM 50 MG/1
50 TABLET ORAL 2 TIMES DAILY
Qty: 180 TABLET | Refills: 3 | Status: SHIPPED | OUTPATIENT
Start: 2022-08-02 | End: 2022-10-19

## 2022-08-19 DIAGNOSIS — M54.41 CHRONIC BILATERAL LOW BACK PAIN WITH BILATERAL SCIATICA: ICD-10-CM

## 2022-08-19 DIAGNOSIS — G89.29 CHRONIC BILATERAL LOW BACK PAIN WITH BILATERAL SCIATICA: ICD-10-CM

## 2022-08-19 DIAGNOSIS — M54.42 CHRONIC BILATERAL LOW BACK PAIN WITH BILATERAL SCIATICA: ICD-10-CM

## 2022-08-22 RX ORDER — GABAPENTIN 300 MG/1
CAPSULE ORAL
Qty: 90 CAPSULE | Refills: 3 | Status: SHIPPED | OUTPATIENT
Start: 2022-08-22 | End: 2022-10-19

## 2022-08-28 ENCOUNTER — MYC MEDICAL ADVICE (OUTPATIENT)
Dept: FAMILY MEDICINE | Facility: CLINIC | Age: 32
End: 2022-08-28

## 2022-08-28 DIAGNOSIS — M54.42 CHRONIC BILATERAL LOW BACK PAIN WITH BILATERAL SCIATICA: ICD-10-CM

## 2022-08-28 DIAGNOSIS — M54.41 CHRONIC BILATERAL LOW BACK PAIN WITH BILATERAL SCIATICA: ICD-10-CM

## 2022-08-28 DIAGNOSIS — G89.29 CHRONIC BILATERAL LOW BACK PAIN WITH BILATERAL SCIATICA: ICD-10-CM

## 2022-08-30 RX ORDER — OXYCODONE AND ACETAMINOPHEN 5; 325 MG/1; MG/1
1 TABLET ORAL 2 TIMES DAILY PRN
Qty: 60 TABLET | Refills: 0 | Status: SHIPPED | OUTPATIENT
Start: 2022-08-30 | End: 2022-09-28

## 2022-09-28 DIAGNOSIS — M54.42 CHRONIC BILATERAL LOW BACK PAIN WITH BILATERAL SCIATICA: ICD-10-CM

## 2022-09-28 DIAGNOSIS — G89.29 CHRONIC BILATERAL LOW BACK PAIN WITH BILATERAL SCIATICA: ICD-10-CM

## 2022-09-28 DIAGNOSIS — M54.41 CHRONIC BILATERAL LOW BACK PAIN WITH BILATERAL SCIATICA: ICD-10-CM

## 2022-09-28 RX ORDER — OXYCODONE AND ACETAMINOPHEN 5; 325 MG/1; MG/1
1 TABLET ORAL 2 TIMES DAILY PRN
Qty: 60 TABLET | Refills: 0 | Status: SHIPPED | OUTPATIENT
Start: 2022-09-28 | End: 2022-10-20

## 2022-09-28 RX ORDER — CYCLOBENZAPRINE HCL 10 MG
10 TABLET ORAL 3 TIMES DAILY PRN
Qty: 90 TABLET | Refills: 0 | Status: SHIPPED | OUTPATIENT
Start: 2022-09-28 | End: 2022-10-19

## 2022-10-19 ENCOUNTER — OFFICE VISIT (OUTPATIENT)
Dept: FAMILY MEDICINE | Facility: CLINIC | Age: 32
End: 2022-10-19
Payer: COMMERCIAL

## 2022-10-19 VITALS
WEIGHT: 147.9 LBS | DIASTOLIC BLOOD PRESSURE: 76 MMHG | SYSTOLIC BLOOD PRESSURE: 110 MMHG | BODY MASS INDEX: 27.05 KG/M2 | HEART RATE: 96 BPM

## 2022-10-19 DIAGNOSIS — F39 MOOD DISORDER (H): ICD-10-CM

## 2022-10-19 DIAGNOSIS — Z23 HIGH PRIORITY FOR 2019-NCOV VACCINE: ICD-10-CM

## 2022-10-19 DIAGNOSIS — M54.42 CHRONIC BILATERAL LOW BACK PAIN WITH BILATERAL SCIATICA: ICD-10-CM

## 2022-10-19 DIAGNOSIS — M54.42 CHRONIC BILATERAL LOW BACK PAIN WITH BILATERAL SCIATICA: Primary | ICD-10-CM

## 2022-10-19 DIAGNOSIS — G89.29 CHRONIC BILATERAL LOW BACK PAIN WITH BILATERAL SCIATICA: ICD-10-CM

## 2022-10-19 DIAGNOSIS — I10 PRIMARY HYPERTENSION: ICD-10-CM

## 2022-10-19 DIAGNOSIS — M54.41 CHRONIC BILATERAL LOW BACK PAIN WITH BILATERAL SCIATICA: Primary | ICD-10-CM

## 2022-10-19 DIAGNOSIS — M54.41 CHRONIC BILATERAL LOW BACK PAIN WITH BILATERAL SCIATICA: ICD-10-CM

## 2022-10-19 DIAGNOSIS — G89.29 CHRONIC BILATERAL LOW BACK PAIN WITH BILATERAL SCIATICA: Primary | ICD-10-CM

## 2022-10-19 DIAGNOSIS — F51.01 PRIMARY INSOMNIA: ICD-10-CM

## 2022-10-19 LAB
AMPHETAMINES UR QL SCN: ABNORMAL
BARBITURATES UR QL SCN: ABNORMAL
BENZODIAZ UR QL SCN: ABNORMAL
BZE UR QL SCN: ABNORMAL
CANNABINOIDS UR QL SCN: ABNORMAL
CREAT UR-MCNC: 220 MG/DL
OPIATES UR QL SCN: ABNORMAL
OXYCODONE UR QL: ABNORMAL
PCP QUAL URINE (ROCHE): ABNORMAL

## 2022-10-19 PROCEDURE — 90686 IIV4 VACC NO PRSV 0.5 ML IM: CPT | Performed by: FAMILY MEDICINE

## 2022-10-19 PROCEDURE — 0124A COVID-19,PF,PFIZER BOOSTER BIVALENT: CPT | Performed by: FAMILY MEDICINE

## 2022-10-19 PROCEDURE — 99214 OFFICE O/P EST MOD 30 MIN: CPT | Mod: 25 | Performed by: FAMILY MEDICINE

## 2022-10-19 PROCEDURE — 90471 IMMUNIZATION ADMIN: CPT | Performed by: FAMILY MEDICINE

## 2022-10-19 PROCEDURE — 91312 COVID-19,PF,PFIZER BOOSTER BIVALENT: CPT | Performed by: FAMILY MEDICINE

## 2022-10-19 PROCEDURE — 80307 DRUG TEST PRSMV CHEM ANLYZR: CPT | Performed by: FAMILY MEDICINE

## 2022-10-19 RX ORDER — TRAZODONE HYDROCHLORIDE 100 MG/1
100 TABLET ORAL AT BEDTIME
Qty: 90 TABLET | Refills: 3 | Status: SHIPPED | OUTPATIENT
Start: 2022-10-19

## 2022-10-19 RX ORDER — DULOXETIN HYDROCHLORIDE 60 MG/1
60 CAPSULE, DELAYED RELEASE ORAL DAILY
Qty: 90 CAPSULE | Refills: 3 | Status: SHIPPED | OUTPATIENT
Start: 2022-10-19

## 2022-10-19 RX ORDER — CYCLOBENZAPRINE HCL 10 MG
10 TABLET ORAL 3 TIMES DAILY PRN
Qty: 90 TABLET | Refills: 11 | Status: SHIPPED | OUTPATIENT
Start: 2022-10-19

## 2022-10-19 RX ORDER — GABAPENTIN 300 MG/1
CAPSULE ORAL
Qty: 90 CAPSULE | Refills: 11 | Status: SHIPPED | OUTPATIENT
Start: 2022-10-19 | End: 2022-10-26

## 2022-10-19 RX ORDER — LOSARTAN POTASSIUM 50 MG/1
50 TABLET ORAL 2 TIMES DAILY
Qty: 180 TABLET | Refills: 3 | Status: SHIPPED | OUTPATIENT
Start: 2022-10-19

## 2022-10-19 NOTE — LETTER
Opioid / Opioid Plus Controlled Substance Agreement    This is an agreement between you and your provider about the safe and appropriate use of controlled substance/opioids prescribed by your care team. Controlled substances are medicines that can cause physical and mental dependence (abuse).    There are strict laws about having and using these medicines. We here at [facility] are committing to working with you in your efforts to get better. To support you in this work, we ll help you schedule regular office appointments for medicine refills. If we must cancel or change your appointment for any reason, we ll make sure you have enough medicine to last until your next appointment.     As a Provider, I will:    Listen carefully to your concerns and treat you with respect.     Recommend a treatment plan that I believe is in your best interest. This plan may involve therapies other than opioid pain medication.     Talk with you often about the possible benefits, and the risk of harm of any medicine that we prescribe for you.     Provide a plan on how to taper (discontinue or go off) using this medicine if the decision is made to stop its use.    As a Patient, I understand that opioid(s):     Are a controlled substance prescribed by my care team to help me function or work and manage my condition(s).     Are strong medicines and can cause serious side effects such as:    Drowsiness, which can seriously affect my driving ability    A lower breathing rate, enough to cause death    Harm to my thinking ability     Depression     Abuse of and addiction to this medicine    Need to be taken exactly as prescribed. Combining opioids with certain medicines or chemicals (such as illegal drugs, sedatives, sleeping pills, and benzodiazepines) can be dangerous or even fatal. If I stop opioids suddenly, I may have severe withdrawal symptoms.    Do not work for all types of pain nor for all patients. If they re not helpful, I may  be asked to stop them.        The risks, benefits and side effects of these medicine(s) were explained to me. I agree that:  1. I will take part in other treatments as advised by my care team. This may be psychiatry or counseling, physical therapy, behavioral therapy, group treatment or a referral to a specialist.     2. I will keep all my appointments. I understand that this is part of the monitoring of opioids. My care team may require an office visit for EVERY opioid/controlled substance refill. If I miss appointments or don t follow instructions, my care team may stop my medicine.    3. I will take my medicines as prescribed. I will not change the dose or schedule unless my care team tells me to. There will be no refills if I run out early.     4. I may be asked to come to the clinic and complete a urine drug test or complete a pill count at any time. If I don t give a urine sample or participate in a pill count, the care team may stop my medicine.    5. I will only receive prescriptions from this clinic for chronic pain. If I am treated by another provider for acute pain issues, I will tell them that I am taking opioid pain medication for chronic pain and that I have a treatment agreement with this provider. I will inform my [facility] care team within one business day if I am given a prescription for any pain medication by another healthcare provider. My [facility] care team can contact other providers and pharmacists about my use of any medicines.    6. It is up to me to make sure that I don t run out of my medicines on weekends or holidays. If my care team is willing to refill my opioid prescription without a visit, I must request refills only during office hours. Refills may take up to 3 business days to process. I will use one pharmacy to fill all my opioid and other controlled substance prescriptions. I will notify the clinic about any changes to my insurance or medication  availability.    7. I am responsible for my prescriptions. If the medicine/prescription is lost, stolen or destroyed, it will not be replaced. I also agree not to share controlled substance medicines with anyone.    8. I am aware I should not use any illegal or recreational drugs. I agree not to drink alcohol unless my care team says I can.       9. If I enroll in the [place] [facility] Cannabis program, I will tell my care team prior to my next refill.     10. I will tell my care team right away if I become pregnant, have a new medical problem treated outside of my regular clinic, or have a change in my medications.    11. I understand that this medicine can affect my thinking, judgment and reaction time. Alcohol and drugs affect the brain and body, which can affect the safety of my driving. Being under the influence of alcohol or drugs can affect my decision-making, behaviors, personal safety, and the safety of others. Driving while impaired (DWI) can occur if a person is driving, operating, or in physical control of a car, motorcycle, boat, snowmobile, ATV, motorbike, off-road vehicle, or any other motor vehicle ([place] Statute 169A.20). I understand the risk if I choose to drive or operate any vehicle or machinery.    I understand that if I do not follow any of the conditions above, my prescriptions or treatment may be stopped or changed.          Opioids  What You Need to Know    What are opioids?   Opioids are pain medicines that must be prescribed by a doctor. They are also known as narcotics.     Examples are:   1. morphine (MS Contin, [place])  2. oxycodone (Oxycontin)  3. oxycodone and acetaminophen (Percocet)  4. hydrocodone and acetaminophen (Vicodin, Norco)   5. fentanyl patch (Duragesic)   6. hydromorphone (Dilaudid)   7. methadone  8. codeine (Tylenol #3)     What do opioids do well?   Opioids are best for severe short-term pain such as after a surgery or injury. They may work well for cancer pain. They may  help some people with long-lasting (chronic) pain.     What do opioids NOT do well?   Opioids never get rid of pain entirely, and they don t work well for most patients with chronic pain. Opioids don t reduce swelling, one of the causes of pain.                                    Other ways to manage chronic pain and improve function include:       Treat the health problem that may be causing pain    Anti-inflammation medicines, which reduce swelling and tenderness, such as ibuprofen (Advil, Motrin) or naproxen (Aleve)    Acetaminophen (Tylenol)    Antidepressants and anti-seizure medicines, especially for nerve pain    Topical treatments such as patches or creams    Injections or nerve blocks    Chiropractic or osteopathic treatment    Acupuncture, massage, deep breathing, meditation, visual imagery, aromatherapy    Use heat or ice at the pain site    Physical therapy     Exercise    Stop smoking    Take part in therapy       Risks and side effects     Talk to your doctor before you start or decide to keep taking opioids. Possible side effects include:      Lowering your breathing rate enough to cause death    Overdose, including death, especially if taking higher than prescribed doses    Worse depression symptoms; less pleasure in things you usually enjoy    Feeling tired or sluggish    Slower thoughts or cloudy thinking    Being more sensitive to pain over time; pain is harder to control    Trouble sleeping or restless sleep    Changes in hormone levels (for example, less testosterone)    Changes in sex drive or ability to have sex    Constipation    Unsafe driving    Itching and sweating    Dizziness    Nausea, throwing up and dry mouth    What else should I know about opioids?    Opioids may lead to dependence, tolerance, or addiction.      Dependence means that if you stop or reduce the medicine too quickly, you will have withdrawal symptoms. These include loose poop (diarrhea), jitters, flu-like symptoms,  nervousness and tremors. Dependence is not the same as addiction.                       Tolerance means needing higher doses over time to get the same effect. This may increase the chance of serious side effects.      Addiction is when people improperly use a substance that harms their body, their mind or their relations with others. Use of opiates can cause a relapse of addiction if you have a history of drug or alcohol abuse.      People who have used opioids for a long time may have a lower quality of life, worse depression, higher levels of pain and more visits to doctors.    You can overdose on opioids. Take these steps to lower your risk of overdose:    1. Recognize the signs:  Signs of overdose include decrease or loss of consciousness (blackout), slowed breathing, trouble waking up and blue lips. If someone is worried about overdose, they should call 911.    2. Talk to your doctor about Narcan (naloxone).   If you are at risk for overdose, you may be given a prescription for Narcan. This medicine very quickly reverses the effects of opioids.   If you overdose, a friend or family member can give you Narcan while waiting for the ambulance. They need to know the signs of overdose and how to give Narcan.     3. Don't use alcohol or street drugs.   Taking them with opioids can cause death.    4. Do not take any of these medicines unless your doctor says it s OK. Taking these with opioids can cause death:    Benzodiazepines, such as lorazepam (Ativan), alprazolam (Xanax) or diazepam (Valium)    Muscle relaxers, such as cyclobenzaprine (Flexeril)    Sleeping pills like zolpidem (Ambien)     Other opioids      How to keep you and other people safe while taking opioids:    1. Never share your opioids with others.  Opioid medicines are regulated by the Drug Enforcement Agency ([employer]). Selling or sharing medications is a criminal act.    2. Be sure to store opioids in a secure place, locked up if possible. Young children  can easily swallow them and overdose.    3. When you are traveling with your medicines, keep them in the original bottles. If you use a pill box, be sure you also carry a copy of your medicine list from your clinic or pharmacy.    4. Safe disposal of opioids    Most pharmacies have places to get rid of medicine, called disposal kiosks. Medicine disposal options are also available in every [facility]. Search your county and  medication disposal  to find more options. You can find more details at:  [email or web address]     I agree that my provider, clinic care team, and pharmacy may work with any city, state or federal law enforcement agency that investigates the misuse, sale, or other diversion of my controlled medicine. I will allow my provider to discuss my care with, or share a copy of, this agreement with any other treating provider, pharmacy or emergency room where I receive care.    I have read this agreement and have asked questions about anything I did not understand.    _______________________________________________________  Patient Signature - Jeannine Carvalho _____________________                   Date     _______________________________________________________  Provider Signature - Magda Reeves MD   _____________________                   Date     _______________________________________________________  Witness Signature (required if provider not present while patient signing)   _____________________                   Date

## 2022-10-19 NOTE — PROGRESS NOTES
Assessment & Plan     Chronic bilateral low back pain with bilateral sciatica; chr knee pain  CSA signed today  Follow-up 3 months  - Urine Drugs of Abuse Screen Panel 1 - Drug Screen (Full)  - gabapentin (NEURONTIN) 300 MG capsule  Dispense: 90 capsule; Refill: 11  - cyclobenzaprine (FLEXERIL) 10 MG tablet  Dispense: 90 tablet; Refill: 11    Primary insomnia  Refilled  Follow-up yearly  - traZODone (DESYREL) 100 MG tablet  Dispense: 90 tablet; Refill: 3    Mood disorder (H)  Refilled   follow-up every 6 months    - traZODone (DESYREL) 100 MG tablet  Dispense: 90 tablet; Refill: 3  - DULoxetine (CYMBALTA) 60 MG capsule  Dispense: 90 capsule; Refill: 3    Primary hypertension  Refilled   Follow-up every 6 months  - losartan (COZAAR) 50 MG tablet  Dispense: 180 tablet; Refill: 3    High priority for 2019-nCoV vaccine  - COVID-19,PF,PFIZER BOOSTER BIVALENT 12+Yrs      Magda Reeves MD  [facility]   Jeannine is a 32 year old presenting for the following health issues:  Imm/Inj (Covid and flu shot), Follow Up, and Imm/Inj (COVID-19 VACCINE)      Imm/Inj    History of Present Illness       Reason for visit:  Med check        Here for med check  She follows up every 3 months or percocet med check  Percocet, flexeril, cymbalta help with back pain  No new concerns        Objective    /76 (BP Location: Left arm, Patient Position: Sitting, Cuff Size: Adult Regular)   Pulse 96   Wt 67.1 kg (147 lb 14.4 oz)   LMP 10/06/2022 (Exact Date)   BMI 27.05 kg/m    Body mass index is 27.05 kg/m .  Physical Exam     Constitutional: Patient is oriented to person, place, and time. Patient appears well-developed and well-nourished. No distress.   Head: Normocephalic and atraumatic.   Right Ear: External ear normal.   Left Ear: External ear normal.   Eyes: Conjunctivae and EOM are normal. Right eye exhibits no discharge. Left eye exhibits no discharge. No scleral icterus.    Neurological: Patient is alert and oriented to person, place, and time.  Skin: No rash noted. Patient is not diaphoretic. No erythema. No pallor.

## 2022-10-20 ENCOUNTER — MYC REFILL (OUTPATIENT)
Dept: FAMILY MEDICINE | Facility: CLINIC | Age: 32
End: 2022-10-20

## 2022-10-20 DIAGNOSIS — M54.42 CHRONIC BILATERAL LOW BACK PAIN WITH BILATERAL SCIATICA: ICD-10-CM

## 2022-10-20 DIAGNOSIS — G89.29 CHRONIC BILATERAL LOW BACK PAIN WITH BILATERAL SCIATICA: ICD-10-CM

## 2022-10-20 DIAGNOSIS — M54.41 CHRONIC BILATERAL LOW BACK PAIN WITH BILATERAL SCIATICA: ICD-10-CM

## 2022-10-21 RX ORDER — OXYCODONE AND ACETAMINOPHEN 5; 325 MG/1; MG/1
1 TABLET ORAL 2 TIMES DAILY PRN
Qty: 60 TABLET | Refills: 0 | Status: SHIPPED | OUTPATIENT
Start: 2022-10-28 | End: 2022-11-22

## 2022-10-21 NOTE — TELEPHONE ENCOUNTER
Routing refill request to provider for review/approval because:  Drug not on the G refill protocol     Requested Prescriptions   Pending Prescriptions Disp Refills     oxyCODONE-acetaminophen (PERCOCET) 5-325 MG tablet 60 tablet 0     Sig: Take 1 tablet by mouth 2 times daily as needed for severe pain Chronic back pain       There is no refill protocol information for this order        Liz Walter RN, BSN  [facility]

## 2022-10-25 DIAGNOSIS — M54.42 CHRONIC BILATERAL LOW BACK PAIN WITH BILATERAL SCIATICA: ICD-10-CM

## 2022-10-25 DIAGNOSIS — G89.29 CHRONIC BILATERAL LOW BACK PAIN WITH BILATERAL SCIATICA: ICD-10-CM

## 2022-10-25 DIAGNOSIS — M54.41 CHRONIC BILATERAL LOW BACK PAIN WITH BILATERAL SCIATICA: ICD-10-CM

## 2022-10-25 NOTE — TELEPHONE ENCOUNTER
Note from the pharmacy stating patient qualifies for a 30 days supply of gabapentin. Can this be filled for a 30 day supply rather than the current 10 ?

## 2022-10-26 RX ORDER — GABAPENTIN 300 MG/1
CAPSULE ORAL
Qty: 270 CAPSULE | Refills: 5 | Status: SHIPPED | OUTPATIENT
Start: 2022-10-26

## 2022-11-21 ENCOUNTER — MYC MEDICAL ADVICE (OUTPATIENT)
Dept: FAMILY MEDICINE | Facility: CLINIC | Age: 32
End: 2022-11-21

## 2022-11-22 ENCOUNTER — MYC REFILL (OUTPATIENT)
Dept: FAMILY MEDICINE | Facility: CLINIC | Age: 32
End: 2022-11-22

## 2022-11-22 DIAGNOSIS — G89.29 CHRONIC BILATERAL LOW BACK PAIN WITH BILATERAL SCIATICA: ICD-10-CM

## 2022-11-22 DIAGNOSIS — M54.41 CHRONIC BILATERAL LOW BACK PAIN WITH BILATERAL SCIATICA: ICD-10-CM

## 2022-11-22 DIAGNOSIS — M54.42 CHRONIC BILATERAL LOW BACK PAIN WITH BILATERAL SCIATICA: ICD-10-CM

## 2022-11-22 RX ORDER — OXYCODONE AND ACETAMINOPHEN 5; 325 MG/1; MG/1
1 TABLET ORAL 2 TIMES DAILY PRN
Qty: 60 TABLET | Refills: 0 | Status: SHIPPED | OUTPATIENT
Start: 2022-11-22 | End: 2022-12-17

## 2022-11-29 ASSESSMENT — ANXIETY QUESTIONNAIRES
GAD7 TOTAL SCORE: 0
7. FEELING AFRAID AS IF SOMETHING AWFUL MIGHT HAPPEN: NOT AT ALL
4. TROUBLE RELAXING: NOT AT ALL
4. TROUBLE RELAXING: NOT AT ALL
3. WORRYING TOO MUCH ABOUT DIFFERENT THINGS: NOT AT ALL
5. BEING SO RESTLESS THAT IT IS HARD TO SIT STILL: NOT AT ALL
GAD7 TOTAL SCORE: 0
1. FEELING NERVOUS, ANXIOUS, OR ON EDGE: NOT AT ALL
GAD7 TOTAL SCORE: 0
3. WORRYING TOO MUCH ABOUT DIFFERENT THINGS: NOT AT ALL
6. BECOMING EASILY ANNOYED OR IRRITABLE: NOT AT ALL
1. FEELING NERVOUS, ANXIOUS, OR ON EDGE: NOT AT ALL
5. BEING SO RESTLESS THAT IT IS HARD TO SIT STILL: NOT AT ALL
6. BECOMING EASILY ANNOYED OR IRRITABLE: NOT AT ALL
2. NOT BEING ABLE TO STOP OR CONTROL WORRYING: NOT AT ALL
GAD7 TOTAL SCORE: 0
7. FEELING AFRAID AS IF SOMETHING AWFUL MIGHT HAPPEN: NOT AT ALL
2. NOT BEING ABLE TO STOP OR CONTROL WORRYING: NOT AT ALL

## 2022-11-29 ASSESSMENT — PATIENT HEALTH QUESTIONNAIRE - PHQ9
SUM OF ALL RESPONSES TO PHQ QUESTIONS 1-9: 0
SUM OF ALL RESPONSES TO PHQ QUESTIONS 1-9: 0

## 2022-12-17 ENCOUNTER — MYC REFILL (OUTPATIENT)
Dept: FAMILY MEDICINE | Facility: CLINIC | Age: 32
End: 2022-12-17

## 2022-12-17 ENCOUNTER — MYC MEDICAL ADVICE (OUTPATIENT)
Dept: FAMILY MEDICINE | Facility: CLINIC | Age: 32
End: 2022-12-17

## 2022-12-17 DIAGNOSIS — M54.42 CHRONIC BILATERAL LOW BACK PAIN WITH BILATERAL SCIATICA: ICD-10-CM

## 2022-12-17 DIAGNOSIS — G89.29 CHRONIC BILATERAL LOW BACK PAIN WITH BILATERAL SCIATICA: ICD-10-CM

## 2022-12-17 DIAGNOSIS — M54.41 CHRONIC BILATERAL LOW BACK PAIN WITH BILATERAL SCIATICA: ICD-10-CM

## 2022-12-19 RX ORDER — OXYCODONE AND ACETAMINOPHEN 5; 325 MG/1; MG/1
1 TABLET ORAL 2 TIMES DAILY PRN
Qty: 60 TABLET | Refills: 0 | Status: SHIPPED | OUTPATIENT
Start: 2022-12-19 | End: 2023-01-16

## 2023-01-14 ENCOUNTER — MYC MEDICAL ADVICE (OUTPATIENT)
Dept: FAMILY MEDICINE | Facility: CLINIC | Age: 33
End: 2023-01-14

## 2023-01-14 DIAGNOSIS — G89.29 CHRONIC BILATERAL LOW BACK PAIN WITH BILATERAL SCIATICA: ICD-10-CM

## 2023-01-14 DIAGNOSIS — M54.41 CHRONIC BILATERAL LOW BACK PAIN WITH BILATERAL SCIATICA: ICD-10-CM

## 2023-01-14 DIAGNOSIS — M54.42 CHRONIC BILATERAL LOW BACK PAIN WITH BILATERAL SCIATICA: ICD-10-CM

## 2023-01-16 RX ORDER — OXYCODONE AND ACETAMINOPHEN 5; 325 MG/1; MG/1
1 TABLET ORAL 2 TIMES DAILY PRN
Qty: 60 TABLET | Refills: 0 | Status: SHIPPED | OUTPATIENT
Start: 2023-01-16 | End: 2023-02-09

## 2023-02-08 ENCOUNTER — MYC MEDICAL ADVICE (OUTPATIENT)
Dept: FAMILY MEDICINE | Facility: CLINIC | Age: 33
End: 2023-02-08
Payer: COMMERCIAL

## 2023-02-08 DIAGNOSIS — G89.29 CHRONIC BILATERAL LOW BACK PAIN WITH BILATERAL SCIATICA: ICD-10-CM

## 2023-02-08 DIAGNOSIS — M54.41 CHRONIC BILATERAL LOW BACK PAIN WITH BILATERAL SCIATICA: ICD-10-CM

## 2023-02-08 DIAGNOSIS — M54.42 CHRONIC BILATERAL LOW BACK PAIN WITH BILATERAL SCIATICA: ICD-10-CM

## 2023-02-09 RX ORDER — OXYCODONE AND ACETAMINOPHEN 5; 325 MG/1; MG/1
1 TABLET ORAL 2 TIMES DAILY PRN
Qty: 60 TABLET | Refills: 0 | Status: SHIPPED | OUTPATIENT
Start: 2023-02-09

## 2023-02-13 ENCOUNTER — OFFICE VISIT (OUTPATIENT)
Dept: FAMILY MEDICINE | Facility: CLINIC | Age: 33
End: 2023-02-13
Payer: COMMERCIAL

## 2023-02-13 VITALS
HEIGHT: 62 IN | HEART RATE: 67 BPM | DIASTOLIC BLOOD PRESSURE: 78 MMHG | BODY MASS INDEX: 27.23 KG/M2 | SYSTOLIC BLOOD PRESSURE: 108 MMHG | OXYGEN SATURATION: 96 % | WEIGHT: 148 LBS | RESPIRATION RATE: 12 BRPM | TEMPERATURE: 98 F

## 2023-02-13 DIAGNOSIS — Z11.4 SCREENING FOR HIV (HUMAN IMMUNODEFICIENCY VIRUS): ICD-10-CM

## 2023-02-13 DIAGNOSIS — M54.41 CHRONIC BILATERAL LOW BACK PAIN WITH BILATERAL SCIATICA: ICD-10-CM

## 2023-02-13 DIAGNOSIS — F51.01 PRIMARY INSOMNIA: ICD-10-CM

## 2023-02-13 DIAGNOSIS — G89.29 CHRONIC BILATERAL LOW BACK PAIN WITH BILATERAL SCIATICA: ICD-10-CM

## 2023-02-13 DIAGNOSIS — M54.42 CHRONIC BILATERAL LOW BACK PAIN WITH BILATERAL SCIATICA: ICD-10-CM

## 2023-02-13 DIAGNOSIS — I10 PRIMARY HYPERTENSION: Primary | ICD-10-CM

## 2023-02-13 DIAGNOSIS — Z11.59 NEED FOR HEPATITIS C SCREENING TEST: ICD-10-CM

## 2023-02-13 LAB
ALBUMIN SERPL BCG-MCNC: 4.2 G/DL (ref 3.5–5.2)
ALP SERPL-CCNC: 77 U/L (ref 35–104)
ALT SERPL W P-5'-P-CCNC: 11 U/L (ref 10–35)
ANION GAP SERPL CALCULATED.3IONS-SCNC: 11 MMOL/L (ref 7–15)
AST SERPL W P-5'-P-CCNC: 27 U/L (ref 10–35)
BILIRUB SERPL-MCNC: 0.3 MG/DL
BUN SERPL-MCNC: 10.6 MG/DL (ref 6–20)
CALCIUM SERPL-MCNC: 9.7 MG/DL (ref 8.6–10)
CHLORIDE SERPL-SCNC: 99 MMOL/L (ref 98–107)
CREAT SERPL-MCNC: 0.78 MG/DL (ref 0.51–0.95)
DEPRECATED HCO3 PLAS-SCNC: 27 MMOL/L (ref 22–29)
GFR SERPL CREATININE-BSD FRML MDRD: >90 ML/MIN/1.73M2
GLUCOSE SERPL-MCNC: 101 MG/DL (ref 70–99)
POTASSIUM SERPL-SCNC: 4.5 MMOL/L (ref 3.4–5.3)
PROT SERPL-MCNC: 6.9 G/DL (ref 6.4–8.3)
SODIUM SERPL-SCNC: 137 MMOL/L (ref 136–145)

## 2023-02-13 PROCEDURE — 80053 COMPREHEN METABOLIC PANEL: CPT | Performed by: NURSE PRACTITIONER

## 2023-02-13 PROCEDURE — 87389 HIV-1 AG W/HIV-1&-2 AB AG IA: CPT | Performed by: NURSE PRACTITIONER

## 2023-02-13 PROCEDURE — 99213 OFFICE O/P EST LOW 20 MIN: CPT | Performed by: NURSE PRACTITIONER

## 2023-02-13 PROCEDURE — 36415 COLL VENOUS BLD VENIPUNCTURE: CPT | Performed by: NURSE PRACTITIONER

## 2023-02-13 PROCEDURE — 86803 HEPATITIS C AB TEST: CPT | Performed by: NURSE PRACTITIONER

## 2023-02-13 ASSESSMENT — ANXIETY QUESTIONNAIRES
5. BEING SO RESTLESS THAT IT IS HARD TO SIT STILL: SEVERAL DAYS
IF YOU CHECKED OFF ANY PROBLEMS ON THIS QUESTIONNAIRE, HOW DIFFICULT HAVE THESE PROBLEMS MADE IT FOR YOU TO DO YOUR WORK, TAKE CARE OF THINGS AT HOME, OR GET ALONG WITH OTHER PEOPLE: SOMEWHAT DIFFICULT
1. FEELING NERVOUS, ANXIOUS, OR ON EDGE: SEVERAL DAYS
GAD7 TOTAL SCORE: 8
3. WORRYING TOO MUCH ABOUT DIFFERENT THINGS: MORE THAN HALF THE DAYS
2. NOT BEING ABLE TO STOP OR CONTROL WORRYING: SEVERAL DAYS
GAD7 TOTAL SCORE: 8
6. BECOMING EASILY ANNOYED OR IRRITABLE: SEVERAL DAYS
7. FEELING AFRAID AS IF SOMETHING AWFUL MIGHT HAPPEN: NOT AT ALL

## 2023-02-13 ASSESSMENT — PATIENT HEALTH QUESTIONNAIRE - PHQ9
5. POOR APPETITE OR OVEREATING: MORE THAN HALF THE DAYS
SUM OF ALL RESPONSES TO PHQ QUESTIONS 1-9: 8

## 2023-02-13 NOTE — PROGRESS NOTES
"  Assessment & Plan     Screening for HIV (human immunodeficiency virus)    - HIV Antigen Antibody Combo    Need for hepatitis C screening test    - Hepatitis C Screen Reflex to HCV RNA Quant and Genotype    Primary hypertension  -Her blood pressure stable today.  She can continue losartan.   - Comprehensive metabolic panel (BMP + Alb, Alk Phos, ALT, AST, Total. Bili, TP)    Primary insomnia  -She is not taking trazodone consistently every night.  She feels that her sleeping is stable.     opioid for Chr b/l low back pain with b/l sciatica; chr knee pain. CSA signed 10/19/22. percocet BID. fu 3 months  -Controlled substance agreement plan we will sign by previous PCP back in October.  She had a drug screen completed at that time as well.  She does get her medication refilled by her pharmacy, she will need to follow-up with a new primary care provider in [place].  I suggested that she make that appointment today.                BMI:   Estimated body mass index is 27.07 kg/m  as calculated from the following:    Height as of this encounter: 1.575 m (5' 2\").    Weight as of this encounter: 67.1 kg (148 lb).           No follow-ups on file.    NAVIN Nair CNP  [facility]    Cammie Paez is a 32 year old, presenting for the following health issues:  Recheck Medication and Establish Care    -She is moving to [place], at the end of this month, and would like to get her labs completed today.  She does get medication refills, which included her narcotic.  She takes Percocet for chronic low back pain.  This has been prescribed by her PCP.  Drug screen was completed in October 2022.   -She feels that her anxiety and depression are stable.  She is currently taking Cymbalta.  She has no other concerns today.   -When she feels anxious, she typically looks at her phone and pages through Facebook/media.      History of Present Illness       Reason for visit:  Check up    She eats " "0-1 servings of fruits and vegetables daily.She consumes 2 sweetened beverage(s) daily.She exercises with enough effort to increase her heart rate 9 or less minutes per day.  She exercises with enough effort to increase her heart rate 3 or less days per week.   She is taking medications regularly.             Review of Systems   Constitutional, HEENT, cardiovascular, pulmonary, gi and gu systems are negative, except as otherwise noted.      Objective    /78   Pulse 67   Temp 98  F (36.7  C) (Oral)   Resp 12   Ht 1.575 m (5' 2\")   Wt 67.1 kg (148 lb)   LMP 01/23/2023 (Approximate)   SpO2 96%   BMI 27.07 kg/m    Body mass index is 27.07 kg/m .  Physical Exam   GENERAL: healthy, alert and no distress  MS: no gross musculoskeletal defects noted, no edema    Lab Results   Component Value Date    WBC 9.4 10/04/2021     Lab Results   Component Value Date    RBC 4.73 10/04/2021     Lab Results   Component Value Date    HGB 13.1 10/04/2021     Lab Results   Component Value Date    HCT 40.1 10/04/2021     No components found for: MCT  Lab Results   Component Value Date    MCV 85 10/04/2021     Lab Results   Component Value Date    MCH 27.7 10/04/2021     Lab Results   Component Value Date    MCHC 32.7 10/04/2021     Lab Results   Component Value Date    RDW 12.1 10/04/2021     Lab Results   Component Value Date     10/04/2021     Last Comprehensive Metabolic Panel:  Sodium   Date Value Ref Range Status   10/04/2021 139 136 - 145 mmol/L Final     Potassium   Date Value Ref Range Status   10/04/2021 4.3 3.5 - 5.0 mmol/L Final     Chloride   Date Value Ref Range Status   10/04/2021 99 98 - 107 mmol/L Final     Carbon Dioxide (CO2)   Date Value Ref Range Status   10/04/2021 30 22 - 31 mmol/L Final     Anion Gap   Date Value Ref Range Status   10/04/2021 10 5 - 18 mmol/L Final     Glucose   Date Value Ref Range Status   10/04/2021 102 70 - 125 mg/dL Final     Urea Nitrogen   Date Value Ref Range Status "   10/04/2021 6 (L) 8 - 22 mg/dL Final     Creatinine   Date Value Ref Range Status   10/04/2021 0.74 0.60 - 1.10 mg/dL Final     GFR Estimate   Date Value Ref Range Status   10/04/2021 >90 >60 mL/min/1.73m2 Final     Comment:     As of July 11, 2021, eGFR is calculated by the CKD-EPI creatinine equation, without race adjustment. eGFR can be influenced by muscle mass, exercise, and diet. The reported eGFR is an estimation only and is only applicable if the renal function is stable.   05/25/2021 >60 >60 mL/min/1.73m2 Final     Calcium   Date Value Ref Range Status   10/04/2021 9.8 8.5 - 10.5 mg/dL Final         Office Visit on 10/19/2022   Component Date Value Ref Range Status     Amphetamines Urine 10/19/2022 Screen Negative  Screen Negative Final    Cutoff for a negative amphetamine is less than 500 ng/mL.     Benzodiazepine Urine 10/19/2022 Screen Negative  Screen Negative Final    Cutoff for a negative benzodiazepine is less than 100 ng/mL.     Opiates Urine 10/19/2022 Screen Negative  Screen Negative Final    Cutoff for a negative opiate is less than 300 ng/mL.     PCP Urine 10/19/2022 Screen Negative  Screen Negative Final    Cutoff for a negative PCP is less than 25 ng/mL.     Cannabinoids Urine 10/19/2022 Screen Negative  Screen Negative Final    Cutoff for a negative cannabinoid is less than 50 ng/mL.     Barbituates Urine 10/19/2022 Screen Negative  Screen Negative Final    Cutoff for a negative barbiturate is less than 200 ng/mL.     Cocaine Urine 10/19/2022 Screen Negative  Screen Negative Final    Cutoff for a negative cocaine is less than 300 ng/mL.     Oxycodone Urine 10/19/2022 Screen Positive (A)  Screen Negative Final    Cutoff for a positive oxycodone is 100 ng/mL or greater.   This is an unconfirmed screening result to be used for medical purposes only.      Creatinine Urine mg/dL 10/19/2022 220.0  mg/dL Final

## 2023-02-14 LAB
HCV AB SERPL QL IA: NONREACTIVE
HIV 1+2 AB+HIV1 P24 AG SERPL QL IA: NONREACTIVE

## 2023-02-21 NOTE — RESULT ENCOUNTER NOTE
Ric Paez    It was a pleasure meeting you!    Your labs overall all look stable and within normal ranges.  If you have any further questions please do not hesitate to let me know.     [place]

## 2023-03-07 ENCOUNTER — MYC MEDICAL ADVICE (OUTPATIENT)
Dept: FAMILY MEDICINE | Facility: CLINIC | Age: 33
End: 2023-03-07
Payer: COMMERCIAL

## 2023-04-22 ENCOUNTER — HEALTH MAINTENANCE LETTER (OUTPATIENT)
Age: 33
End: 2023-04-22

## 2024-06-29 ENCOUNTER — HEALTH MAINTENANCE LETTER (OUTPATIENT)
Age: 34
End: 2024-06-29

## 2025-07-13 ENCOUNTER — HEALTH MAINTENANCE LETTER (OUTPATIENT)
Age: 35
End: 2025-07-13